# Patient Record
Sex: FEMALE | Race: WHITE | HISPANIC OR LATINO
[De-identification: names, ages, dates, MRNs, and addresses within clinical notes are randomized per-mention and may not be internally consistent; named-entity substitution may affect disease eponyms.]

---

## 2020-06-15 ENCOUNTER — APPOINTMENT (OUTPATIENT)
Dept: ORTHOPEDIC SURGERY | Facility: CLINIC | Age: 50
End: 2020-06-15
Payer: COMMERCIAL

## 2020-06-15 VITALS
HEIGHT: 60 IN | BODY MASS INDEX: 36.52 KG/M2 | WEIGHT: 186 LBS | SYSTOLIC BLOOD PRESSURE: 145 MMHG | HEART RATE: 67 BPM | DIASTOLIC BLOOD PRESSURE: 84 MMHG

## 2020-06-15 DIAGNOSIS — S62.664A NONDISPLACED FRACTURE OF DISTAL PHALANX OF RIGHT RING FINGER, INITIAL ENCOUNTER FOR CLOSED FRACTURE: ICD-10-CM

## 2020-06-15 DIAGNOSIS — M65.331 TRIGGER FINGER, RIGHT MIDDLE FINGER: ICD-10-CM

## 2020-06-15 DIAGNOSIS — Z78.9 OTHER SPECIFIED HEALTH STATUS: ICD-10-CM

## 2020-06-15 DIAGNOSIS — Z86.39 PERSONAL HISTORY OF OTHER ENDOCRINE, NUTRITIONAL AND METABOLIC DISEASE: ICD-10-CM

## 2020-06-15 DIAGNOSIS — M65.311 TRIGGER THUMB, RIGHT THUMB: ICD-10-CM

## 2020-06-15 PROCEDURE — 20550 NJX 1 TENDON SHEATH/LIGAMENT: CPT | Mod: F5

## 2020-06-15 PROCEDURE — 99244 OFF/OP CNSLTJ NEW/EST MOD 40: CPT | Mod: 25

## 2020-06-16 NOTE — PROCEDURE
[de-identified] : Injection: Right hand, first digit.\par Indication: Trigger finger.\par \par A discussion was had with the patient regarding this procedure and all questions were answered. All risks, benefits and alternatives were discussed. These included but were not limited to bleeding, infection, and allergic reaction. A timeout was performed prior to the procedure to ensure proper side and location.  Alcohol was used to clean and sterilize the skin over the volar aspect of the MCP. A 25-gauge needle was used to inject 0.5cc of 0.5% marcaine and 1cc of 6mg/mL betamethasone into the flexor tendon sheath. A sterile bandage was then applied. The patient tolerated the procedure well and there were no complications. \par _______________\par Injection: Right hand, third digit.\par Indication: Trigger finger.\par \par A discussion was had with the patient regarding this procedure and all questions were answered. All risks, benefits and alternatives were discussed. These included but were not limited to bleeding, infection, and allergic reaction. A timeout was performed prior to the procedure to ensure proper side and location.  Alcohol was used to clean and sterilize the skin over the volar aspect of the MCP. A 25-gauge needle was used to inject 0.5cc of 0.5% marcaine and 1cc of 6mg/mL betamethasone into the flexor tendon sheath. A sterile bandage was then applied. The patient tolerated the procedure well and there were no complications.

## 2020-06-16 NOTE — HISTORY OF PRESENT ILLNESS
[de-identified] : Patient is here for right hand pain. She states that it began about 6 months ago. She does not recall any particular injury. She was seen on 6/9/2020 and sent for an xray that was positive for fracture in her 4th digit. She states that her 3rd digit gets stuck. She has N/T throughout her hand. She has continued working at [employer] during this time. She has taken OTC pain medications. \par \par The patient's past medical history, past surgical history, medications and allergies were reviewed by me today and documented accordingly. In addition, the patient's family and social history, which were noncontributory to this visit, were reviewed also. Intake form was reviewed. The patient has no family history of arthritis.

## 2020-06-16 NOTE — PHYSICAL EXAM
[de-identified] : Constitutional: Well-nourished, well-developed, No acute distress\par Respiratory:  Good respiratory effort, no SOB\par Lymphatic: No regional lymphadenopathy, no lymphedema\par Psychiatric: Pleasant and normal affect, alert and oriented x3\par Skin: Clean dry and intact B/L UE/LE\par Musculoskeletal: normal except where as noted in regional exam\par \par \par Left hand:\par APPEARANCE: no marked deformities, no swelling or malalignment\par POSITIVE TENDERNESS: none\par NONTENDER: osseous and ligamentous structures. \par ROM: full & painless in CMC, MCP, and IP joints. \par RESISTIVE TESTING: painless resisted testing. \par SPECIAL TESTS: normal sensation of 1st through 5th digits, normal flex/ext, abd/add, and opposition of thumb, no triggering of fingers\par Vasc: 2+ radial pulse, normal capillary refill\par Neuro: AIN, PIN, Ulnar nerve intact to motor\par Sensation: Intact to light touch throughout\par \par B/L Elbows:  No asymmetry, malalignment, or swelling, Full ROM, 5/5 strength in flex/ext, pronation/supination, Joints stable\par B/L wrists: No asymmetry, malalignment, or swelling, Full ROM, 5/5 strength in wrist flexion/ext, and radial/ulnar deviation, Joints stable\par \par Right hand:\par APPEARANCE: no marked deformities, no swelling or malalignment\par POSITIVE TENDERNESS: mild along flexor tendon and sheath superficial to the MCP at the first and third digits\par NONTENDER: osseous and ligamentous structures. \par ROM: full & painless although + triggering of the involved finger going from full flexion into extension. \par RESISTIVE TESTING: painless resisted testing. \par SPECIAL TESTS: normal sensation of 1st through 5th digits, normal flex/ext, abd/add, and opposition of thumb, no triggering of other fingers\par Vasc: 2+ radial pulse, normal capillary refill\par Neuro: AIN, PIN, Ulnar nerve intact to motor\par Sensation: Intact to light touch throughout\par  [de-identified] : I reviewed and clinically correlated the following outside imaging studies,\par \par RIGHT HAND XRAY 2 VIEWS\par HISTORY: M25.341 Right hand instability G56.01 Carpal tunnel syndrome, right upper limb\par M79.641 Right hand pain M05.841 Right hand rheumatoid arthritis with rheumatoid factor\par Views: AP and lateral.\par There is fracture deformity of the tuft of the distal phalanx of the right fourth finger.\par Superimposed densities are likely part of the patient's manicure. There are no arthritic\par changes. Carpal bones are intact. Epiphyses are normal.\par IMPRESSION: There is fracture deformity of the tuft of the distal phalanx of the right\par fourth finger.

## 2020-06-16 NOTE — CONSULT LETTER
[Consult Letter:] : I had the pleasure of evaluating your patient, [unfilled]. [Dear  ___] : Dear  [unfilled], [Please see my note below.] : Please see my note below. [Sincerely,] : Sincerely, [FreeTextEntry2] : [id number], [place], [address] [place] [address] [FreeTextEntry3] : Maksim Palacio DO, ATC\par Primary Care Sports Medicine\par [facility]

## 2020-06-16 NOTE — DISCUSSION/SUMMARY
[de-identified] : Discussed findings of today's exam and possible causes of patient's pain.  Educated patient on their most probable diagnosis of right hand first and third digit trigger fingers.  Reviewed possible courses of treatment, and we collaboratively decided best course of treatment at this time will include cortisone injections today (see procedure note).  Informed the patient that the numbing medicine in today's injection will last for about 4-6 hours. The steroid that was injected will start to work in 1 to 2 days, peak at 1-2 weeks, and may last up to 4-6 weeks. Discussed with the patient the expected course of trigger fingers, and the variable response to cortisone injections. Some are relieved with a single injection, while others require repeat injections.  Based on the patient's symptoms today, no hand therapy will likely be needed.  We will wait and see how patient responds to this one cortisone injection, may consider repeat injection in 6 weeks if issue is improved but not fully resolved. Patient may consider consultation with hand/wrist surgeon to have a discussion about repeat cortisone injections, versus possible surgical intervention.  \par I also reviewed patient's right hand x-ray today that was performed at [facility] and ordered by her PCP.  It shows that patient has a chronic nondisplaced comminuted distal phalanx fracture of the right hand fourth digit.  I educated the patient and her  that this is likely some distant tuft fracture where the bones never fully healed.  However, patient has no abnormal findings on clinical exam, no pain in this digit, no intervention needed for this old tuft fracture.\par Patient appreciates and agrees with current plan.\par \par This note was generated using dragon medical dictation software.  A reasonable effort has been made for proofreading its contents, but typos may still remain.  If there are any questions or points of clarification needed please notify my office.

## 2020-06-22 ENCOUNTER — APPOINTMENT (OUTPATIENT)
Dept: ORTHOPEDIC SURGERY | Facility: CLINIC | Age: 50
End: 2020-06-22
Payer: COMMERCIAL

## 2020-06-22 PROCEDURE — 73030 X-RAY EXAM OF SHOULDER: CPT | Mod: RT

## 2020-06-22 PROCEDURE — 72040 X-RAY EXAM NECK SPINE 2-3 VW: CPT

## 2020-06-22 PROCEDURE — 99214 OFFICE O/P EST MOD 30 MIN: CPT

## 2020-06-22 RX ORDER — MELOXICAM 7.5 MG/1
7.5 TABLET ORAL
Qty: 30 | Refills: 0 | Status: ACTIVE | COMMUNITY
Start: 2020-06-22 | End: 1900-01-01

## 2020-06-22 NOTE — REASON FOR VISIT
[Initial Visit] : an initial visit for [Spouse] : spouse [FreeTextEntry2] : neck/right shoulder pain

## 2020-06-22 NOTE — HISTORY OF PRESENT ILLNESS
[de-identified] : Patient is here for neck/right shoulder pain that began about 6 months ago. She does not recall any particular injury. She pain that radiates down the length of her arm. She has increased pain when lifting her arm. She has N/T into her hands. She has taken OTC pain medications but nothing else to treat this pain. Denies prior injury.\par \par She also states that she has continued to have pain in her 3rd digit despite injection. There has been no recent injury.

## 2020-06-22 NOTE — DISCUSSION/SUMMARY
[de-identified] : Discussed findings of today's exam and possible causes of patient's pain.  Educated patient on their most probable diagnosis of right shoulder impingement, and right cervical paraspinal muscle spasm, with intermittent right cervical radiculopathy.  Reviewed possible courses of treatment, and we collaboratively decided best course of treatment at this time will include conservative management.  Patient started on a course of oral NSAIDs, prescription given for Mobic (We discussed all possible side effects of this medication).  Patient will be started on a course of physical therapy to restore normal range of motion and strength as tolerated.  Follow up as needed.  Patient appreciates and agrees with current plan.\par A  was utilized to communicate the patient's primary language.\par \par This note was generated using dragon medical dictation software.  A reasonable effort has been made for proofreading its contents, but typos may still remain.  If there are any questions or points of clarification needed please notify my office.

## 2020-06-22 NOTE — PHYSICAL EXAM
[de-identified] : Constitutional: Well-nourished, well-developed, No acute distress\par Respiratory:  Good respiratory effort, no SOB\par Lymphatic: No regional lymphadenopathy, no lymphedema\par Psychiatric: Pleasant and normal affect, alert and oriented x3\par Skin: Clean dry and intact B/L UE/LE\par Musculoskeletal: normal except where as noted in regional exam\par \par Cervical Spine Exam\par APPEARANCE: no marked deformities or malalignment, normal curvature, good posture\par POSITIVE TENDERNESS: + Right upper trapezius, levator scapula, rhomboid major, and rhomboid minor, + spasm noted in the same muscles.\par NONTENDER: no bony midline tenderness.\par ROM: limited in all planes, most notably in flexion and sidebending due to pain\par RESISTIVE TESTING: painful 4/5 resisted ext, right sidebending, rotation and shoulder shrug , 5/5 flexion \par SPECIAL TESTS: neg Spurling's b/l\par Vasc: 2+ radial pulse b/l\par Neuro: C5 - T1 intact to motor, DTRs 2+/4 biceps, triceps, brachioradialis\par Sensation: Intact to light touch throughout b/l UE\par \par Thoracic spine:  normal curvature and normal alignment. good posture. no midline bony tenderness, no marked spasm. no marked tenderness in paraspinal muscles.  ROM full & painless all planes\par \par Left Shoulder:\par APPEARANCE: no marked deformities, no swelling or malalignment\par POSITIVE TENDERNESS: none\par NONTENDER: supraspinatus, infraspinatus, teres minor, LH biceps, anterior and posterior capsule, AC joint\par ROM: full & painless, no scapular winging or dyskinesia present\par RESISTIVE TESTING: painless 5/5 resisted flex/ext, empty can/ER/IR, horizontal abd/add \par SPECIAL TESTS: neg Drop Arm, neg Empty Can, neg Madsen/Neers, neg Mckee's, neg Speeds, neg Apprehension, neg cross arm adduction, neg apley's scratch test\par Vasc: 2+ radial pulse\par Neuro: AIN, PIN, Ulnar nerve intact to motor, DTRs 2+/4 biceps, triceps, brachioradialis\par Sensation: Intact to light touch throughout\par B/L Elbows:  No asymmetry, malalignment, or swelling, Full ROM, 5/5 strength in flexion/ext, pronation/supination, Joints stable\par B/L Wrist and Hand:  No asymmetry, malalignment, or swelling, Full ROM, 5/5 strength in wrist and long finger flexion/ext, radial/ulnar deviation, Joints stable\par \par Right Shoulder:\par APPEARANCE: no marked deformities, no swelling or malalignment\par POSITIVE TENDERNESS: supraspinatus, long head biceps tendon\par NONTENDER:  infraspinatus, teres minor. biceps. anterior and posterior capsule. AC joint. \par ROM: full with mild painful arc past 60 degrees, no scapular winging or dyskinesia present\par RESISTIVE TESTING: MMT 4+/5 ER, Flexion and Empty can, 5/5 IR. painless 5/5 resisted ext, horizontal abd/add \par SPECIAL TESTS: + Madsen and Neers, mildly + cross arm adduction, + Speeds, neg Mckee's, neg Drop Arm, neg Apprehension. neg apley's scratch test\par Vasc: 2+ radial pulse\par Neuro: AIN, PIN, Ulnar nerve intact to motor, DTRs 2+/4 biceps, triceps, brachioradialis\par Sensation: Intact to light touch throughout\par  [de-identified] : \par The following radiographs were ordered and read by me during this patient's visit. I reviewed each radiograph in detail with the patient and discussed the findings as highlighted below. \par \par 3 views of the right shoulder were obtained today that show no fracture, or dislocation. There are no degenerative changes seen. There is no malalignment. No obvious osseous abnormality. Otherwise unremarkable.\par \par 2 views of the cervical spine were obtained today that show no fracture, or dislocation. There are no degenerative changes seen. There is no malalignment. No obvious osseous abnormality. Otherwise unremarkable.

## 2020-09-08 ENCOUNTER — APPOINTMENT (OUTPATIENT)
Dept: ORTHOPEDIC SURGERY | Facility: CLINIC | Age: 50
End: 2020-09-08
Payer: COMMERCIAL

## 2020-09-08 DIAGNOSIS — M75.41 IMPINGEMENT SYNDROME OF RIGHT SHOULDER: ICD-10-CM

## 2020-09-08 PROCEDURE — 20550 NJX 1 TENDON SHEATH/LIGAMENT: CPT | Mod: F7

## 2020-09-08 PROCEDURE — 99214 OFFICE O/P EST MOD 30 MIN: CPT | Mod: 25

## 2020-09-08 NOTE — PHYSICAL EXAM
[de-identified] : Constitutional: Well-nourished, well-developed, No acute distress\par Respiratory: Good respiratory effort, no SOB\par Lymphatic: No regional lymphadenopathy, no lymphedema\par Psychiatric: Pleasant and normal affect, alert and oriented x3\par Skin: Clean dry and intact B/L UE/LE\par Musculoskeletal: normal except where as noted in regional exam\par \par Cervical Spine Exam\par APPEARANCE: no marked deformities or malalignment, normal curvature, good posture\par POSITIVE TENDERNESS: + Right upper trapezius, levator scapula, rhomboid major, and rhomboid minor, + spasm noted in the same muscles.\par NONTENDER: no bony midline tenderness.\par ROM: limited in all planes, most notably in flexion and sidebending due to pain\par RESISTIVE TESTING: painful 4/5 resisted ext, right sidebending, rotation and shoulder shrug , 5/5 flexion \par SPECIAL TESTS: neg Spurling's b/l\par Vasc: 2+ radial pulse b/l\par Neuro: C5 - T1 intact to motor, DTRs 2+/4 biceps, triceps, brachioradialis\par Sensation: Intact to light touch throughout b/l UE\par \par Right Shoulder:\par APPEARANCE: no marked deformities, no swelling or malalignment\par POSITIVE TENDERNESS: supraspinatus, long head biceps tendon\par NONTENDER: infraspinatus, teres minor. biceps. anterior and posterior capsule. AC joint. \par ROM: full with mild painful arc past 60 degrees, no scapular winging or dyskinesia present\par RESISTIVE TESTING: MMT 4+/5 ER, Flexion and Empty can, 5/5 IR. painless 5/5 resisted ext, horizontal abd/add \par SPECIAL TESTS: + Madsen and Neers, mildly + cross arm adduction, + Speeds, neg Mckee's, neg Drop Arm, neg Apprehension. neg apley's scratch test\par Vasc: 2+ radial pulse\par Neuro: AIN, PIN, Ulnar nerve intact to motor, DTRs 2+/4 biceps, triceps, brachioradialis\par Sensation: Intact to light touch throughout\par \par Right hand:\par APPEARANCE: no marked deformities, no swelling or malalignment\par POSITIVE TENDERNESS: mild along flexor tendon and sheath superficial to the MCP at the first and third digits\par NONTENDER: osseous and ligamentous structures. \par ROM: full & painless although + triggering of the involved finger going from full flexion into extension. \par RESISTIVE TESTING: painless resisted testing. \par SPECIAL TESTS: normal sensation of 1st through 5th digits, normal flex/ext, abd/add, and opposition of thumb, no triggering of other fingers\par Vasc: 2+ radial pulse, normal capillary refill\par Neuro: AIN, PIN, Ulnar nerve intact to motor\par Sensation: Intact to light touch throughout\par

## 2020-09-08 NOTE — PROCEDURE
[de-identified] : Injection: Right 3rd digit.\par Indication: Trigger finger.\par \par A discussion was had with the patient regarding this procedure and all questions were answered. All risks, benefits and alternatives were discussed. These included but were not limited to bleeding, infection, and allergic reaction. A timeout was performed prior to the procedure to ensure proper side and location.  Alcohol was used to clean and sterilize the skin over the volar aspect of the MCP. A 25-gauge needle was used to inject 0.5cc of 0.5% marcaine and 1cc of 6mg/mL betamethasone into the flexor tendon sheath. A sterile bandage was then applied. The patient tolerated the procedure well and there were no complications.

## 2020-09-08 NOTE — DISCUSSION/SUMMARY
[de-identified] : Discussed findings of today's exam and possible causes of patient's pain.  Educated patient on their most probable diagnosis of right hand third digit trigger finger.  Reviewed possible courses of treatment, and we collaboratively decided best course of treatment at this time will include cortisone injection today (see procedure note).  Informed the patient that the numbing medicine in today's injection will last for about 4-6 hours. The steroid that was injected will start to work in 1 to 2 days, peak at 1-2 weeks, and may last up to 4-6 weeks. Discussed with the patient the expected course of trigger fingers, and the variable response to cortisone injections. Some are relieved with a single injection, while others require repeat injections.  Patient consented to repeat cortisone injection to the involved trigger fingers today.  We will wait and see how patient responds to this cortisone injection. Advised patient if the problem persists I would recommend consultation with hand/wrist surgeon to have a discussion about surgical intervention as further repeat injections are unlikely to be of much benefit.  \par Patient was also seen today for reevaluation of neck pain and right shoulder pain, primarily still has clinical exam findings consistent with right shoulder impingement and intermittent right upper extremity radiculopathy due to underlying cervical osteoarthritis.  Patient is having some improvement with physical therapy, recommend continuation of her course of PT at this time.  If patient has persisting neck pain and/or radiculopathy would recommend MRI of the C-spine as neck step in management.  Follow up as needed.  Patient appreciates and agrees with current plan.\par \par A  was utilized to communicate the patient's primary language.\par This note was generated using dragon medical dictation software.  A reasonable effort has been made for proofreading its contents, but typos may still remain.  If there are any questions or points of clarification needed please notify my office.

## 2020-09-08 NOTE — HISTORY OF PRESENT ILLNESS
[de-identified] : Patient is here for neck/right hand 3rd digit pain follow up. She has been attending PT and noticed some improvement but is still having pain. She had relief of her trigger finger with the injection but the pain and dysfunction has returned. There has been no recent injury. She has been taking Meloxicam.

## 2020-12-01 ENCOUNTER — APPOINTMENT (OUTPATIENT)
Dept: ORTHOPEDIC SURGERY | Facility: CLINIC | Age: 50
End: 2020-12-01
Payer: COMMERCIAL

## 2020-12-01 PROCEDURE — 20550 NJX 1 TENDON SHEATH/LIGAMENT: CPT | Mod: F7

## 2020-12-01 PROCEDURE — 99072 ADDL SUPL MATRL&STAF TM PHE: CPT

## 2020-12-01 PROCEDURE — 99213 OFFICE O/P EST LOW 20 MIN: CPT | Mod: 25

## 2020-12-01 NOTE — DISCUSSION/SUMMARY
[de-identified] : Patient was seen today for reevaluation and continue management of right hand middle finger digit trigger finger.  Patient advised that repeat injections after the second injection tend to have decreased efficacy, however for pain management at this time patient elected to proceed with repeat cortisone injection today (see procedure note).  Informed the patient that the numbing medicine in today's injection will last for about 4-6 hours. The steroid that was injected will start to work in 1 to 2 days, peak at 1-2 weeks, and may last up to 1-2 months.  I advised the patient and her  they would benefit from scheduling an office visit to have a hand surgery consultation to discuss more permanent resolution of this issue.  Patient is also having chronic intermittent pain of her second through fifth PIP joints of the right hand, there are no significant abnormal findings on exam today.  Recommend use of over-the-counter paraffin wax bath in the morning to help with chronic stiffness.  Patient may take Tylenol as needed for pain.  Patient appreciates and agrees with current plan.\par \par This note was generated using dragon medical dictation software.  A reasonable effort has been made for proofreading its contents, but typos may still remain.  If there are any questions or points of clarification needed please notify my office.

## 2020-12-01 NOTE — HISTORY OF PRESENT ILLNESS
[de-identified] : Patient is here for right hand 3rd digit pain follow up. She had relief from the last injection but her pain returned without further injury.

## 2020-12-01 NOTE — PROCEDURE
[de-identified] : Injection: Right 3rd digit.\par Indication: Trigger finger.\par \par A discussion was had with the patient regarding this procedure and all questions were answered. All risks, benefits and alternatives were discussed. These included but were not limited to bleeding, infection, and allergic reaction. A timeout was performed prior to the procedure to ensure proper side and location.  Alcohol was used to clean and sterilize the skin over the volar aspect of the MCP. A 25-gauge needle was used to inject 0.5cc of 0.5% marcaine and 1cc of 6mg/mL betamethasone into the flexor tendon sheath. A sterile bandage was then applied. The patient tolerated the procedure well and there were no complications.

## 2020-12-01 NOTE — PHYSICAL EXAM
[de-identified] : Constitutional: Well-nourished, well-developed, No acute distress\par Respiratory: Good respiratory effort, no SOB\par Lymphatic: No regional lymphadenopathy, no lymphedema\par Psychiatric: Pleasant and normal affect, alert and oriented x3\par Skin: Clean dry and intact B/L UE/LE\par Musculoskeletal: normal except where as noted in regional exam\par \par Right hand:\par APPEARANCE: no marked deformities, no swelling or malalignment\par POSITIVE TENDERNESS: mild along flexor tendon and sheath superficial to the MCP at the first and third digits\par NONTENDER: osseous and ligamentous structures. \par ROM: full & painless although + triggering of the involved finger going from full flexion into extension. \par RESISTIVE TESTING: painless resisted testing. \par SPECIAL TESTS: normal sensation of 1st through 5th digits, normal flex/ext, abd/add, and opposition of thumb, no triggering of other fingers\par Vasc: 2+ radial pulse, normal capillary refill\par Neuro: AIN, PIN, Ulnar nerve intact to motor\par Sensation: Intact to light touch throughout\par \par Right hand:\par APPEARANCE:  no swelling, no marked deformities or malalignment of the fingers\par POSITIVE TENDERNESS: none\par NONTENDER: all other osseous and ligamentous structures. \par ROM: full & painless in CMC, MCP, and IP joints. \par RESISTIVE TESTING: painless resisted testing. \par

## 2020-12-01 NOTE — REASON FOR VISIT
[Follow-Up Visit] : a follow-up visit for [Spouse] : spouse [FreeTextEntry2] : right hand 3rd digit pain

## 2021-01-07 ENCOUNTER — APPOINTMENT (OUTPATIENT)
Dept: ORTHOPEDIC SURGERY | Facility: CLINIC | Age: 51
End: 2021-01-07
Payer: COMMERCIAL

## 2021-01-07 VITALS — SYSTOLIC BLOOD PRESSURE: 145 MMHG | HEART RATE: 62 BPM | DIASTOLIC BLOOD PRESSURE: 90 MMHG

## 2021-01-07 PROCEDURE — 99214 OFFICE O/P EST MOD 30 MIN: CPT

## 2021-01-07 PROCEDURE — 99072 ADDL SUPL MATRL&STAF TM PHE: CPT

## 2021-02-04 ENCOUNTER — APPOINTMENT (OUTPATIENT)
Dept: ORTHOPEDIC SURGERY | Facility: CLINIC | Age: 51
End: 2021-02-04
Payer: SELF-PAY

## 2021-02-04 PROCEDURE — 20550 NJX 1 TENDON SHEATH/LIGAMENT: CPT | Mod: F7

## 2021-02-04 PROCEDURE — 99072 ADDL SUPL MATRL&STAF TM PHE: CPT

## 2021-02-04 PROCEDURE — 99214 OFFICE O/P EST MOD 30 MIN: CPT | Mod: 25

## 2021-03-10 ENCOUNTER — APPOINTMENT (OUTPATIENT)
Dept: ORTHOPEDIC SURGERY | Facility: CLINIC | Age: 51
End: 2021-03-10

## 2021-03-25 ENCOUNTER — APPOINTMENT (OUTPATIENT)
Dept: ORTHOPEDIC SURGERY | Facility: CLINIC | Age: 51
End: 2021-03-25
Payer: SELF-PAY

## 2021-03-25 DIAGNOSIS — Z78.9 OTHER SPECIFIED HEALTH STATUS: ICD-10-CM

## 2021-03-25 DIAGNOSIS — M65.332 TRIGGER FINGER, LEFT MIDDLE FINGER: ICD-10-CM

## 2021-03-25 PROCEDURE — 99072 ADDL SUPL MATRL&STAF TM PHE: CPT

## 2021-03-25 PROCEDURE — 20612 ASPIRATE/INJ GANGLION CYST: CPT | Mod: F9

## 2021-03-25 PROCEDURE — 99214 OFFICE O/P EST MOD 30 MIN: CPT | Mod: 25

## 2021-03-25 PROCEDURE — 20550 NJX 1 TENDON SHEATH/LIGAMENT: CPT | Mod: F5,59

## 2021-03-28 PROBLEM — Z78.9 NON-SMOKER: Status: ACTIVE | Noted: 2021-03-28

## 2021-05-06 ENCOUNTER — APPOINTMENT (OUTPATIENT)
Dept: ORTHOPEDIC SURGERY | Facility: CLINIC | Age: 51
End: 2021-05-06
Payer: COMMERCIAL

## 2021-05-26 ENCOUNTER — APPOINTMENT (OUTPATIENT)
Dept: ORTHOPEDIC SURGERY | Facility: CLINIC | Age: 51
End: 2021-05-26
Payer: COMMERCIAL

## 2021-05-26 DIAGNOSIS — M67.88 OTHER SPECIFIED DISORDERS OF SYNOVIUM AND TENDON, OTHER SITE: ICD-10-CM

## 2021-05-26 DIAGNOSIS — M77.8 OTHER ENTHESOPATHIES, NOT ELSEWHERE CLASSIFIED: ICD-10-CM

## 2021-05-26 DIAGNOSIS — G89.29 PAIN IN UNSPECIFIED FOOT: ICD-10-CM

## 2021-05-26 DIAGNOSIS — M62.89 OTHER SPECIFIED DISORDERS OF MUSCLE: ICD-10-CM

## 2021-05-26 DIAGNOSIS — M79.673 PAIN IN UNSPECIFIED FOOT: ICD-10-CM

## 2021-05-26 PROCEDURE — 99214 OFFICE O/P EST MOD 30 MIN: CPT

## 2021-05-26 PROCEDURE — 99072 ADDL SUPL MATRL&STAF TM PHE: CPT

## 2021-05-26 PROCEDURE — 73630 X-RAY EXAM OF FOOT: CPT | Mod: 50

## 2021-05-26 RX ORDER — MELOXICAM 15 MG/1
15 TABLET ORAL
Qty: 30 | Refills: 2 | Status: ACTIVE | COMMUNITY
Start: 2021-05-26 | End: 1900-01-01

## 2021-06-10 ENCOUNTER — APPOINTMENT (OUTPATIENT)
Dept: ORTHOPEDIC SURGERY | Facility: CLINIC | Age: 51
End: 2021-06-10
Payer: COMMERCIAL

## 2021-06-10 DIAGNOSIS — M65.311 TRIGGER THUMB, RIGHT THUMB: ICD-10-CM

## 2021-06-10 DIAGNOSIS — G56.01 CARPAL TUNNEL SYNDROME, RIGHT UPPER LIMB: ICD-10-CM

## 2021-06-10 DIAGNOSIS — M67.441 GANGLION, RIGHT HAND: ICD-10-CM

## 2021-06-10 DIAGNOSIS — G56.02 CARPAL TUNNEL SYNDROME, LEFT UPPER LIMB: ICD-10-CM

## 2021-06-10 DIAGNOSIS — M65.341 TRIGGER FINGER, RIGHT RING FINGER: ICD-10-CM

## 2021-06-10 PROCEDURE — 99072 ADDL SUPL MATRL&STAF TM PHE: CPT

## 2021-06-10 PROCEDURE — 99214 OFFICE O/P EST MOD 30 MIN: CPT

## 2021-06-14 PROBLEM — M67.441 GANGLION CYST OF FLEXOR TENDON SHEATH OF FINGER, RIGHT: Status: ACTIVE | Noted: 2021-02-04

## 2021-06-14 PROBLEM — G56.01 CARPAL TUNNEL SYNDROME OF RIGHT WRIST: Status: ACTIVE | Noted: 2021-01-07

## 2021-06-14 PROBLEM — M65.341 TRIGGER RING FINGER OF RIGHT HAND: Status: ACTIVE | Noted: 2021-02-04

## 2021-06-14 PROBLEM — M65.311 TRIGGER FINGER OF RIGHT THUMB: Status: ACTIVE | Noted: 2020-06-15

## 2021-06-14 PROBLEM — G56.02 CARPAL TUNNEL SYNDROME OF LEFT WRIST: Status: ACTIVE | Noted: 2021-01-07

## 2021-07-02 ENCOUNTER — OUTPATIENT (OUTPATIENT)
Dept: OUTPATIENT SERVICES | Facility: HOSPITAL | Age: 51
LOS: 1 days | End: 2021-07-02
Payer: SELF-PAY

## 2021-07-02 VITALS
DIASTOLIC BLOOD PRESSURE: 76 MMHG | OXYGEN SATURATION: 99 % | WEIGHT: 201.06 LBS | RESPIRATION RATE: 20 BRPM | TEMPERATURE: 98 F | SYSTOLIC BLOOD PRESSURE: 110 MMHG | HEIGHT: 60 IN | HEART RATE: 71 BPM

## 2021-07-02 DIAGNOSIS — M67.441 GANGLION, RIGHT HAND: ICD-10-CM

## 2021-07-02 DIAGNOSIS — M65.311 TRIGGER THUMB, RIGHT THUMB: ICD-10-CM

## 2021-07-02 DIAGNOSIS — Z87.39 PERSONAL HISTORY OF OTHER DISEASES OF THE MUSCULOSKELETAL SYSTEM AND CONNECTIVE TISSUE: ICD-10-CM

## 2021-07-02 DIAGNOSIS — M65.331 TRIGGER FINGER, RIGHT MIDDLE FINGER: ICD-10-CM

## 2021-07-02 DIAGNOSIS — Z98.890 OTHER SPECIFIED POSTPROCEDURAL STATES: Chronic | ICD-10-CM

## 2021-07-02 DIAGNOSIS — Z01.818 ENCOUNTER FOR OTHER PREPROCEDURAL EXAMINATION: ICD-10-CM

## 2021-07-02 LAB
ANION GAP SERPL CALC-SCNC: 13 MMOL/L — SIGNIFICANT CHANGE UP (ref 5–17)
BUN SERPL-MCNC: 11 MG/DL — SIGNIFICANT CHANGE UP (ref 7–23)
CALCIUM SERPL-MCNC: 9.5 MG/DL — SIGNIFICANT CHANGE UP (ref 8.4–10.5)
CHLORIDE SERPL-SCNC: 109 MMOL/L — HIGH (ref 96–108)
CO2 SERPL-SCNC: 20 MMOL/L — LOW (ref 22–31)
CREAT SERPL-MCNC: 0.65 MG/DL — SIGNIFICANT CHANGE UP (ref 0.5–1.3)
GLUCOSE SERPL-MCNC: 123 MG/DL — HIGH (ref 70–99)
HCT VFR BLD CALC: 39.6 % — SIGNIFICANT CHANGE UP (ref 34.5–45)
HGB BLD-MCNC: 12.4 G/DL — SIGNIFICANT CHANGE UP (ref 11.5–15.5)
MCHC RBC-ENTMCNC: 25.5 PG — LOW (ref 27–34)
MCHC RBC-ENTMCNC: 31.3 GM/DL — LOW (ref 32–36)
MCV RBC AUTO: 81.3 FL — SIGNIFICANT CHANGE UP (ref 80–100)
NRBC # BLD: 0 /100 WBCS — SIGNIFICANT CHANGE UP (ref 0–0)
PLATELET # BLD AUTO: 319 K/UL — SIGNIFICANT CHANGE UP (ref 150–400)
POTASSIUM SERPL-MCNC: 4 MMOL/L — SIGNIFICANT CHANGE UP (ref 3.5–5.3)
POTASSIUM SERPL-SCNC: 4 MMOL/L — SIGNIFICANT CHANGE UP (ref 3.5–5.3)
RBC # BLD: 4.87 M/UL — SIGNIFICANT CHANGE UP (ref 3.8–5.2)
RBC # FLD: 15.3 % — HIGH (ref 10.3–14.5)
SODIUM SERPL-SCNC: 142 MMOL/L — SIGNIFICANT CHANGE UP (ref 135–145)
WBC # BLD: 4.24 K/UL — SIGNIFICANT CHANGE UP (ref 3.8–10.5)
WBC # FLD AUTO: 4.24 K/UL — SIGNIFICANT CHANGE UP (ref 3.8–10.5)

## 2021-07-02 PROCEDURE — 80048 BASIC METABOLIC PNL TOTAL CA: CPT

## 2021-07-02 PROCEDURE — G0463: CPT

## 2021-07-02 PROCEDURE — 85027 COMPLETE CBC AUTOMATED: CPT

## 2021-07-02 RX ORDER — SODIUM CHLORIDE 9 MG/ML
3 INJECTION INTRAMUSCULAR; INTRAVENOUS; SUBCUTANEOUS EVERY 8 HOURS
Refills: 0 | Status: DISCONTINUED | OUTPATIENT
Start: 2021-07-16 | End: 2021-07-30

## 2021-07-02 RX ORDER — LIDOCAINE HCL 20 MG/ML
0.2 VIAL (ML) INJECTION ONCE
Refills: 0 | Status: DISCONTINUED | OUTPATIENT
Start: 2021-07-16 | End: 2021-07-30

## 2021-07-02 NOTE — H&P PST ADULT - ATTENDING COMMENTS
At Marina 10, 2021, office visit, the patient’s primary problem was painful triggering right long finger. Physical exam showed marked tenderness of A1 pulley, and there was active and painful triggering of right long finger. Because patient has had 4 cortisone injections for right long trigger finger, additional cortisone injections are contraindicated, and therefore, surgical release of right long trigger finger is indicated.      At Marina 10, 2021 office visit other findings included: right thumb A1 pulley is minimally tender without triggering; right ring finger A1 pulley minimally tender without triggering, and right little finger A1 pulley cyst was palpable but nontender and there was no triggering in this digit.     In addition, on Marina 10, 2021, the patient had normal sensation in all fingers at rest bilaterally, and provocative testing for carpal tunnel syndrome (Lashanda test) resulted in only mild symptoms bilaterally. Previously performed electrodiagnostic studies showed normal median sensorimotor conductions across the wrist and normal EMG testing, bilaterally.  Right carpal tunnel release has been discussed with patient and , but on Marina 10, 2021, symptoms were quite mild. Carpal tunnel release is not formally planned, but if symptoms are acute immediately preop on July 16, 2021, then carpal tunnel cortisone injection or carpal tunnel release will be reconsidered.     Surgery for right long trigger finger is indicated because of symptoms refractory to conservative treatment. Surgical plan is A1 pulley release, exam under anesthesia with active finger flexion/extension, and excision of one slip of FDS tendon if there is continued triggering after A1 pulley release.      As noted above, release or cortisone injection(s) of other trigger fingers, excision of retinacular cyst, and possible carpal tunnel cortisone injection or release will all be reconsidered immediately preop associated with reexamination of the patient and conferencing with the patient and .     The patient has pain and interference with activities of daily living. While the patient may see improvement, the patient may not have complete range of motion or complete return to activities of daily living. Postoperative hand therapy, and other treatment modalities may be required. The range of treatment alternatives, and the associated risks complications limitations and expectations were explained and discussed. Infection, incomplete range of motion, stiffness, pain, palmar fibromatosis are a just few of many factors reviewed and discussed with the patient.     All questions have been answered. The patient and her  have expressed acceptance and understanding of the above and have consented to surgery.

## 2021-07-02 NOTE — H&P PST ADULT - NSANTHOSAYNRD_GEN_A_CORE
No. LILA screening performed.  STOP BANG Legend: 0-2 = LOW Risk; 3-4 = INTERMEDIATE Risk; 5-8 = HIGH Risk

## 2021-07-02 NOTE — H&P PST ADULT - NSICDXPASTSURGICALHX_GEN_ALL_CORE_FT
PAST SURGICAL HISTORY:   delivery delivered x2     PAST SURGICAL HISTORY:   delivery delivered x2    History of endoscopy h/o upper endoscopy

## 2021-07-02 NOTE — H&P PST ADULT - NSICDXPROBLEM_GEN_ALL_CORE_FT
PROBLEM DIAGNOSES  Problem: History of trigger finger  Assessment and Plan: scheduled for right long finger trigger release/ poss right thumb trigger release/ poss excision retinacular cyst right little finger/ poss partial tendon excision/ poss cortisone injection righ tthumb right ring and right little finger.  preop instruction discussed and patient verbalized understanding  ucg on admit, urine cup given   instructed on the use of chlorhexidine wash

## 2021-07-02 NOTE — H&P PST ADULT - NSICDXPASTMEDICALHX_GEN_ALL_CORE_FT
PAST MEDICAL HISTORY:  Foot pain, bilateral started on pain meds unkonwn name    History of migraine headaches      PAST MEDICAL HISTORY:  2019 novel coronavirus disease (COVID-19)     Depression no medication    Foot pain, bilateral started on pain meds unkonwn name    History of migraine headaches     History of trigger finger     Mild heartburn

## 2021-07-02 NOTE — H&P PST ADULT - HISTORY OF PRESENT ILLNESS
50 year old Guinean speaking female presents with her  for preop testing for scheduled right long finger trigger release/ possible right thumb trigger release/ possible excision retinacular cyst right little finger/ possible partial tendon excision/ possible cortisone injection right thumb, right ring and right little finger trigger. Patient c/o pain and numbness, limited ROM, no swelling noted. Also her medical history includes hypothyroidism, obesity, heartburn, migraine headache, and previous covid-19 infection (had loss of taste and smell, no hospitalization).    ***Patient is fully vaccinated, copy of vaccination card on file

## 2021-07-15 ENCOUNTER — TRANSCRIPTION ENCOUNTER (OUTPATIENT)
Age: 51
End: 2021-07-15

## 2021-07-16 ENCOUNTER — APPOINTMENT (OUTPATIENT)
Dept: ORTHOPEDIC SURGERY | Facility: HOSPITAL | Age: 51
End: 2021-07-16

## 2021-07-16 ENCOUNTER — OUTPATIENT (OUTPATIENT)
Dept: OUTPATIENT SERVICES | Facility: HOSPITAL | Age: 51
LOS: 1 days | End: 2021-07-16
Payer: COMMERCIAL

## 2021-07-16 VITALS
HEIGHT: 60 IN | OXYGEN SATURATION: 99 % | TEMPERATURE: 98 F | SYSTOLIC BLOOD PRESSURE: 124 MMHG | DIASTOLIC BLOOD PRESSURE: 82 MMHG | WEIGHT: 201.06 LBS | RESPIRATION RATE: 16 BRPM | HEART RATE: 58 BPM

## 2021-07-16 VITALS
DIASTOLIC BLOOD PRESSURE: 79 MMHG | OXYGEN SATURATION: 97 % | TEMPERATURE: 97 F | RESPIRATION RATE: 15 BRPM | HEART RATE: 70 BPM | SYSTOLIC BLOOD PRESSURE: 130 MMHG

## 2021-07-16 DIAGNOSIS — M65.311 TRIGGER THUMB, RIGHT THUMB: ICD-10-CM

## 2021-07-16 DIAGNOSIS — Z01.818 ENCOUNTER FOR OTHER PREPROCEDURAL EXAMINATION: ICD-10-CM

## 2021-07-16 DIAGNOSIS — M67.441 GANGLION, RIGHT HAND: ICD-10-CM

## 2021-07-16 DIAGNOSIS — Z98.890 OTHER SPECIFIED POSTPROCEDURAL STATES: Chronic | ICD-10-CM

## 2021-07-16 DIAGNOSIS — M65.331 TRIGGER FINGER, RIGHT MIDDLE FINGER: ICD-10-CM

## 2021-07-16 PROCEDURE — 20550 NJX 1 TENDON SHEATH/LIGAMENT: CPT | Mod: 59,F9

## 2021-07-16 PROCEDURE — 20550 NJX 1 TENDON SHEATH/LIGAMENT: CPT | Mod: F8

## 2021-07-16 PROCEDURE — 26055 INCISE FINGER TENDON SHEATH: CPT | Mod: F7

## 2021-07-16 RX ORDER — CHLORHEXIDINE GLUCONATE 213 G/1000ML
1 SOLUTION TOPICAL ONCE
Refills: 0 | Status: COMPLETED | OUTPATIENT
Start: 2021-07-16 | End: 2021-07-16

## 2021-07-16 RX ORDER — LANSOPRAZOLE 15 MG/1
1 CAPSULE, DELAYED RELEASE ORAL
Qty: 0 | Refills: 0 | DISCHARGE

## 2021-07-16 RX ORDER — CELECOXIB 200 MG/1
1 CAPSULE ORAL
Qty: 0 | Refills: 0 | DISCHARGE

## 2021-07-16 RX ORDER — LEVOTHYROXINE SODIUM 125 MCG
1 TABLET ORAL
Qty: 0 | Refills: 0 | DISCHARGE

## 2021-07-16 RX ADMIN — CHLORHEXIDINE GLUCONATE 1 APPLICATION(S): 213 SOLUTION TOPICAL at 06:10

## 2021-07-16 NOTE — ASU DISCHARGE PLAN (ADULT/PEDIATRIC) - CARE PROVIDER_API CALL
Jose De Guzman (MD)  Orthopaedic Surgery; Surgery of the Hand  [address], [facility] [address]  [place] [address]  Phone: [phone]  Fax: [phone]  Follow Up Time:

## 2021-07-16 NOTE — ASU PATIENT PROFILE, ADULT - INTERNATIONAL TRAVEL
No Spine appears normal, but she has limited rom of the neck due to pain and pain on palp of the supraspinatus m of r shoulder, no muscle or joint tenderness

## 2021-07-16 NOTE — BRIEF OPERATIVE NOTE - NSICDXBRIEFPREOP_GEN_ALL_CORE_FT
PRE-OP DIAGNOSIS:  Right trigger finger 16-Jul-2021 07:58:36 long finger, ring finger, little finger Kit Orosco

## 2021-07-16 NOTE — BRIEF OPERATIVE NOTE - NSICDXBRIEFPROCEDURE_GEN_ALL_CORE_FT
PROCEDURES:  Release, trigger finger 16-Jul-2021 07:58:12 right hand  long finger Kit Orosco  Injection of trigger finger 16-Jul-2021 07:59:12 ring, middle finger Kit Orosco

## 2021-07-16 NOTE — BRIEF OPERATIVE NOTE - OPERATION/FINDINGS
Corticosteroid injection performed for R ring and little finger trigger fingers. Release of long finger trigger performed

## 2021-07-16 NOTE — ASU PATIENT PROFILE, ADULT - REASON FOR ADMISSION, PROFILE
right ring finger trigger release possible right thumb trigger release possible excision Retinacular cyst

## 2021-07-16 NOTE — BRIEF OPERATIVE NOTE - NSICDXBRIEFPOSTOP_GEN_ALL_CORE_FT
POST-OP DIAGNOSIS:  Right trigger finger 16-Jul-2021 07:58:58 long finger, ring finger, little finger Kit Orosco

## 2021-07-16 NOTE — ASU PATIENT PROFILE, ADULT - PMH
2019 novel coronavirus disease (COVID-19)    Depression  no medication  Foot pain, bilateral  started on pain meds unkonwn name  History of migraine headaches    History of trigger finger    Mild heartburn

## 2021-07-19 ENCOUNTER — NON-APPOINTMENT (OUTPATIENT)
Age: 51
End: 2021-07-19

## 2021-07-29 ENCOUNTER — APPOINTMENT (OUTPATIENT)
Dept: ORTHOPEDIC SURGERY | Facility: CLINIC | Age: 51
End: 2021-07-29
Payer: COMMERCIAL

## 2021-07-29 DIAGNOSIS — M65.331 TRIGGER FINGER, RIGHT MIDDLE FINGER: ICD-10-CM

## 2021-07-29 DIAGNOSIS — M65.351 TRIGGER FINGER, RIGHT LITTLE FINGER: ICD-10-CM

## 2021-07-29 PROCEDURE — 99024 POSTOP FOLLOW-UP VISIT: CPT

## 2022-01-10 PROBLEM — U07.1 COVID-19: Chronic | Status: ACTIVE | Noted: 2021-07-02

## 2022-01-10 PROBLEM — Z86.69 PERSONAL HISTORY OF OTHER DISEASES OF THE NERVOUS SYSTEM AND SENSE ORGANS: Chronic | Status: ACTIVE | Noted: 2021-07-02

## 2022-01-10 PROBLEM — Z87.39 PERSONAL HISTORY OF OTHER DISEASES OF THE MUSCULOSKELETAL SYSTEM AND CONNECTIVE TISSUE: Chronic | Status: ACTIVE | Noted: 2021-07-02

## 2022-01-10 PROBLEM — F32.9 MAJOR DEPRESSIVE DISORDER, SINGLE EPISODE, UNSPECIFIED: Chronic | Status: ACTIVE | Noted: 2021-07-02

## 2022-01-10 PROBLEM — R12 HEARTBURN: Chronic | Status: ACTIVE | Noted: 2021-07-02

## 2022-01-18 ENCOUNTER — APPOINTMENT (OUTPATIENT)
Dept: ORTHOPEDIC SURGERY | Facility: CLINIC | Age: 52
End: 2022-01-18
Payer: COMMERCIAL

## 2022-01-18 PROCEDURE — 99214 OFFICE O/P EST MOD 30 MIN: CPT

## 2022-01-18 PROCEDURE — 99072 ADDL SUPL MATRL&STAF TM PHE: CPT
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
## 2022-01-21 ENCOUNTER — APPOINTMENT (OUTPATIENT)
Dept: ORTHOPEDIC SURGERY | Facility: CLINIC | Age: 52
End: 2022-01-21

## 2022-02-10 ENCOUNTER — RX RENEWAL (OUTPATIENT)
Age: 52
End: 2022-02-10

## 2022-03-14 ENCOUNTER — RX RENEWAL (OUTPATIENT)
Age: 52
End: 2022-03-14

## 2022-03-14 RX ORDER — MELOXICAM 7.5 MG/1
7.5 TABLET ORAL
Qty: 30 | Refills: 0 | Status: ACTIVE | COMMUNITY
Start: 2022-01-18 | End: 1900-01-01

## 2022-04-20 PROBLEM — Z00.00 ENCOUNTER FOR PREVENTIVE HEALTH EXAMINATION: Noted: 2022-04-20

## 2022-04-26 ENCOUNTER — APPOINTMENT (OUTPATIENT)
Dept: PAIN MANAGEMENT | Facility: CLINIC | Age: 52
End: 2022-04-26

## 2022-07-25 ENCOUNTER — APPOINTMENT (OUTPATIENT)
Dept: ORTHOPEDIC SURGERY | Facility: CLINIC | Age: 52
End: 2022-07-25

## 2022-07-25 VITALS — BODY MASS INDEX: 38.09 KG/M2 | HEIGHT: 60 IN | WEIGHT: 194 LBS

## 2022-07-25 DIAGNOSIS — M72.2 PLANTAR FASCIAL FIBROMATOSIS: ICD-10-CM

## 2022-07-25 PROCEDURE — 99213 OFFICE O/P EST LOW 20 MIN: CPT

## 2022-07-25 PROCEDURE — 99072 ADDL SUPL MATRL&STAF TM PHE: CPT
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
## 2022-07-26 NOTE — IMAGING
[de-identified] : right foot:\par plantar tenderness\par \par left foot:\par plantar tenderness\par \par Lumbar spine:\par bilateral lumbar paraspinal spasm \par bilateral lumbar paraspinal tenderness\par equivocal straight leg raise  \par \par right knee:\par 3-115\par NVI\par medial joint line tenderness\par \par left knee:\par 3-115\par NVI\par medial joint line tenderness\par \par right hip:\par limited ER and IR\par 5/5\par \par left hip:\par limited ER and IR\par 5/5\par
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
## 2022-09-26 ENCOUNTER — APPOINTMENT (OUTPATIENT)
Dept: ORTHOPEDIC SURGERY | Facility: CLINIC | Age: 52
End: 2022-09-26

## 2022-09-26 VITALS — WEIGHT: 194 LBS | HEIGHT: 60 IN | BODY MASS INDEX: 38.09 KG/M2

## 2022-09-26 PROCEDURE — 20611 DRAIN/INJ JOINT/BURSA W/US: CPT

## 2022-09-26 PROCEDURE — J3490M: CUSTOM

## 2022-09-26 PROCEDURE — 99072 ADDL SUPL MATRL&STAF TM PHE: CPT

## 2022-09-26 PROCEDURE — 99214 OFFICE O/P EST MOD 30 MIN: CPT | Mod: 25

## 2022-09-26 NOTE — PROCEDURE
[Large Joint Injection] : Large joint injection [Knee] : knee [Pain] : pain [Inflammation] : inflammation [X-ray evidence of Osteoarthritis on this or prior visit] : x-ray evidence of Osteoarthritis on this or prior visit [Alcohol] : alcohol [Betadine] : betadine [Ethyl Chloride sprayed topically] : ethyl chloride sprayed topically [Sterile technique used] : sterile technique used [___ cc    1%] : Lidocaine ~Vcc of 1%  [___ cc    0.25%] : Bupivacaine (Marcaine) ~Vcc of 0.25%  [___ cc    40mg] : Methylprednisolone (Depomedrol) ~Vcc of 40 mg  [] : Patient tolerated procedure well [Call if redness, pain or fever occur] : call if redness, pain or fever occur [Apply ice for 15min out of every hour for the next 12-24 hours as tolerated] : apply ice for 15 minutes out of every hour for the next 12-24 hours as tolerated [Patient was advised to rest the joint(s) for ____ days] : patient was advised to rest the joint(s) for [unfilled] days [Previous OTC use and PT nontherapeutic] : patient has tried OTC's including aspirin, Ibuprofen, Aleve, etc or prescription NSAIDS, and/or exercises at home and/or physical therapy without satisfactory response [Risks, benefits, alternatives discussed / Verbal consent obtained] : the risks benefits, and alternatives have been discussed, and verbal consent was obtained [Prior failure or difficult injection] : prior failure or difficult injection [All ultrasound images have been permanently captured and stored accordingly in our picture archiving and communication system] : All ultrasound images have been permanently captured and stored accordingly in our picture archiving and communication system [Visualization of the needle and placement of injection was performed without complication] : visualization of the needle and placement of injection was performed without complication [Left] : of the left

## 2022-09-26 NOTE — IMAGING
[de-identified] : right foot:\par plantar tenderness\par \par left foot:\par plantar tenderness\par \par Lumbar spine:\par bilateral lumbar paraspinal spasm \par bilateral lumbar paraspinal tenderness\par equivocal straight leg raise  \par \par right knee:\par 3-115\par NVI\par medial joint line tenderness\par \par left knee:\par 3-115\par NVI\par medial joint line tenderness\par \par right hip:\par limited ER and IR\par 5/5\par \par left hip:\par limited ER and IR\par 5/5\par
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
## 2022-09-26 NOTE — DISCUSSION/SUMMARY
[de-identified] : The risks, benefits, contents and alternatives to injection were explained in full to the patient.  Risks outlined include but are not limited to infection, sepsis, bleeding, scarring, skin discoloration, temporary increase in pain, syncopal episode, failure to resolve symptoms, allergic reaction, flare reaction, permanent white skin discoloration, symptom recurrence, and elevation of blood sugar in diabetics.  Patient understood the risks.  All questions were answered.  After discussion of options, patient requested an injection.  Oral informed consent was obtained and sterile prep was done of the injection site.  Sterile technique was used to introduce the mixture.  Patient tolerated the procedure well.  Patient advised to ice the injection site this evening.  Signs and symptoms of infection reviewed and patient advised to call immediately for redness, fevers, and/or chills.
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
## 2022-09-29 ENCOUNTER — FORM ENCOUNTER (OUTPATIENT)
Age: 52
End: 2022-09-29

## 2022-09-30 ENCOUNTER — APPOINTMENT (OUTPATIENT)
Dept: MRI IMAGING | Facility: CLINIC | Age: 52
End: 2022-09-30

## 2022-09-30 PROCEDURE — 99072 ADDL SUPL MATRL&STAF TM PHE: CPT

## 2022-09-30 PROCEDURE — 72148 MRI LUMBAR SPINE W/O DYE: CPT

## 2022-10-11 ENCOUNTER — APPOINTMENT (OUTPATIENT)
Dept: ORTHOPEDIC SURGERY | Facility: CLINIC | Age: 52
End: 2022-10-11

## 2022-10-11 VITALS — BODY MASS INDEX: 38.09 KG/M2 | HEIGHT: 60 IN | WEIGHT: 194 LBS

## 2022-10-11 DIAGNOSIS — M72.2 PLANTAR FASCIAL FIBROMATOSIS: ICD-10-CM

## 2022-10-11 PROCEDURE — 99214 OFFICE O/P EST MOD 30 MIN: CPT

## 2022-10-11 PROCEDURE — 73562 X-RAY EXAM OF KNEE 3: CPT | Mod: 50

## 2022-10-11 PROCEDURE — 99072 ADDL SUPL MATRL&STAF TM PHE: CPT
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
## 2022-10-11 NOTE — DATA REVIEWED
[MRI] : MRI [Lumbar Spine] : lumbar spine [Report was reviewed and noted in the chart] : The report was reviewed and noted in the chart [I independently reviewed and interpreted images and report] : I independently reviewed and interpreted images and report [I reviewed the films/CD and additionally noted] : I reviewed the films/CD and additionally noted [FreeTextEntry1] : L5-S1 anterolisthesis with disc bulging

## 2022-10-11 NOTE — DISCUSSION/SUMMARY
[de-identified] : The risks, benefits, contents and alternatives to injection were explained in full to the patient.  Risks outlined include but are not limited to infection, sepsis, bleeding, scarring, skin discoloration, temporary increase in pain, syncopal episode, failure to resolve symptoms, allergic reaction, flare reaction, permanent white skin discoloration, symptom recurrence, and elevation of blood sugar in diabetics.  Patient understood the risks.  All questions were answered.  After discussion of options, patient requested an injection.  Oral informed consent was obtained and sterile prep was done of the injection site.  Sterile technique was used to introduce the mixture.  Patient tolerated the procedure well.  Patient advised to ice the injection site this evening.  Signs and symptoms of infection reviewed and patient advised to call immediately for redness, fevers, and/or chills. \par \par Entered by Apoorva Villareal acting as scribe.\par

## 2022-10-11 NOTE — IMAGING
[de-identified] : right foot:\par plantar tenderness\par \par left foot:\par plantar tenderness\par \par Lumbar spine:\par bilateral lumbar paraspinal spasm \par bilateral lumbar paraspinal tenderness\par equivocal straight leg raise  \par \par right knee:\par 3-115\par NVI\par medial joint line tenderness\par \par left knee:\par 3-115\par NVI\par medial joint line tenderness\par \par right hip:\par limited ER and IR\par 5/5\par \par left hip:\par limited ER and IR\par 5/5\par

## 2022-10-11 NOTE — PROCEDURE
[FreeTextEntry3] : Large joint injection was performed on the right knee. The indication for this procedure was pain, inflammation, and x-ray evidence of Osteoarthritis on this or prior visit. The site was prepped with betadine, ethyl chloride sprayed topically, and sterile technique used. An injection of Lidocaine 3cc of 1% , Bupivacaine (Marcaine) 5cc of 0.25% , Methylprednisolone (Depomedrol) cc of 80 mg was used. Patient was advised to call if redness, pain, or fever occur, apply ice for 15 minutes out of every hour for the next 12-24 hours as tolerated and patient was advised to rest the joint(s) for days.\par Patient has tried OTC's including aspirin, Ibuprofen, Aleve, etc or prescription NSAIDS, and/or exercises at home and/or physical therapy without satisfactory response and patient had decreased mobility in the joint. Ultrasound guidance was indicated for this patient due to better visualize joint space. All ultrasound images have been permanently captured and stored accordingly in our picture.  \par \par Aspiration of the knee was performed using an 18-gauge needle under sterile conditions\par ------------cc of ---------------fluid was aspirated\par (this was sent for cell count, culture, gram stain, and crystals)

## 2022-10-19 ENCOUNTER — APPOINTMENT (OUTPATIENT)
Dept: ORTHOPEDIC SURGERY | Facility: CLINIC | Age: 52
End: 2022-10-19

## 2022-10-19 VITALS — WEIGHT: 190 LBS | BODY MASS INDEX: 37.3 KG/M2 | HEIGHT: 60 IN

## 2022-10-19 DIAGNOSIS — M72.2 PLANTAR FASCIAL FIBROMATOSIS: ICD-10-CM

## 2022-10-19 PROCEDURE — 73630 X-RAY EXAM OF FOOT: CPT | Mod: RT

## 2022-10-19 PROCEDURE — L3485 HEEL, PAD, REMOVABLE FOR SPUR: CPT | Mod: RT

## 2022-10-19 PROCEDURE — 99072 ADDL SUPL MATRL&STAF TM PHE: CPT

## 2022-10-19 PROCEDURE — 99214 OFFICE O/P EST MOD 30 MIN: CPT

## 2022-10-19 RX ORDER — METHYLPREDNISOLONE 4 MG/1
4 TABLET ORAL
Qty: 1 | Refills: 0 | Status: ACTIVE | COMMUNITY
Start: 2022-10-19 | End: 1900-01-01
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
## 2022-10-19 NOTE — ASSESSMENT
[FreeTextEntry1] : The source of most of her symptoms seems to be coming from the lumbar spine, but there is some focal tenderness to the plantar fascia. \par \par Will recommended MDP\par An airheel is recommended.\par Supportive shoes \par Will start PT.\par \par f/u 1 month

## 2022-10-19 NOTE — DATA REVIEWED
[MRI] : MRI [Lumbar Spine] : lumbar spine [I independently reviewed and interpreted images and report] : I independently reviewed and interpreted images and report [I reviewed the films/CD and agree] : I reviewed the films/CD and agree [FreeTextEntry1] : 9/30/22: 1. Convex left curvature.\par 2. L4-L5: Loss of disc signal. Broad bulge, facet arthrosis, and ligamentum flavum hypertrophy with inferior\par foraminal stenosis left greater than right. Central annular fissure.\par 3. L5-S1: Grade 1 anterior spondylolisthesis. Bilateral spondylolysis. Loss of disc signal and height. Broad \par bulge, facet arthrosis with inferior foraminal stenosis. Bulge and superimposed central herniation with annular \par fissure impressing on the thecal sac.

## 2022-10-19 NOTE — PHYSICAL EXAM
[NL (40)] : plantar flexion 40 degrees [NL 30)] : inversion 30 degrees [NL (20)] : eversion 20 degrees [5___] : [facility][unfilled]/5 [Positive Tinel sign at _____] : Positive Tinel sign at [unfilled] [2+] : posterior tibialis pulse: 2+ [Normal] : saphenous nerve sensation normal [Right] : right foot [Weight -] : weightbearing [There are no fractures, subluxations or dislocations. No significant abnormalities are seen] : There are no fractures, subluxations or dislocations. No significant abnormalities are seen [] : non-antalgic [de-identified] : +hypersensitivity throughout the foot.

## 2022-10-19 NOTE — PHYSICAL EXAM
[NL (40)] : plantar flexion 40 degrees [NL 30)] : inversion 30 degrees [NL (20)] : eversion 20 degrees [5___] : [facility] 5[unfilled]/5 [Positive Tinel sign at _____] : Positive Tinel sign at [unfilled] [2+] : posterior tibialis pulse: 2+ [Normal] : saphenous nerve sensation normal [Right] : right foot [Weight -] : weightbearing [There are no fractures, subluxations or dislocations. No significant abnormalities are seen] : There are no fractures, subluxations or dislocations. No significant abnormalities are seen [] : non-antalgic [de-identified] : +hypersensitivity throughout the foot.

## 2022-10-27 ENCOUNTER — APPOINTMENT (OUTPATIENT)
Dept: ORTHOPEDIC SURGERY | Facility: CLINIC | Age: 52
End: 2022-10-27

## 2022-10-27 VITALS — BODY MASS INDEX: 37.3 KG/M2 | HEIGHT: 60 IN | WEIGHT: 190 LBS

## 2022-10-27 PROCEDURE — 99214 OFFICE O/P EST MOD 30 MIN: CPT

## 2022-10-27 PROCEDURE — 99072 ADDL SUPL MATRL&STAF TM PHE: CPT

## 2022-10-27 NOTE — DATA REVIEWED
[MRI] : MRI [Lumbar Spine] : lumbar spine [Report was reviewed and noted in the chart] : The report was reviewed and noted in the chart [I independently reviewed and interpreted images and report] : I independently reviewed and interpreted images and report [I reviewed the films/CD and additionally noted] : I reviewed the films/CD and additionally noted [FreeTextEntry1] : L5-S1 spondylolisthesis

## 2022-10-27 NOTE — PHYSICAL EXAM
[Able to Communicate] : able to communicate [Well Developed] : well developed [Well Nourished] : well nourished [Peripheral vascular exam is grossly normal] : peripheral vascular exam is grossly normal [No Respiratory Distress] : no respiratory distress [Lungs clear to auscultation bilaterally] : lungs clear to auscultation bilaterally [Flexion] : flexion [Extension] : extension [] : mildly antalgic
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
## 2022-11-11 ENCOUNTER — APPOINTMENT (OUTPATIENT)
Dept: PAIN MANAGEMENT | Facility: CLINIC | Age: 52
End: 2022-11-11

## 2022-11-11 VITALS — BODY MASS INDEX: 37.3 KG/M2 | HEIGHT: 60 IN | WEIGHT: 190 LBS

## 2022-11-11 PROCEDURE — 99204 OFFICE O/P NEW MOD 45 MIN: CPT

## 2022-11-11 PROCEDURE — 99072 ADDL SUPL MATRL&STAF TM PHE: CPT

## 2022-11-11 NOTE — ASSESSMENT
[FreeTextEntry1] : After discussing various treatment options with the patient including but not limited to oral medications, physical therapy, exercise, modalities as well as interventional spinal injections, we have decided with the following plan:\par \par 1) Intervention Injection Therapy:\par I personally reviewed the MRI/CT scan images and agree with the radiologist's report. The radiological findings were discussed with the patient.\par The risks, benefits, contents and alternatives to injection were explained in full to the patient. Risks outlined include but are not limited to infection,sepsis, bleeding, post-dural puncture headache, nerve damage, temporary increase in pain, syncopal episode, failure to resolve symptoms, allergic reaction, symptom recurrence, and elevation of blood sugar in diabetics. Cortisone may cause immunosuppression. Patient understands the risks. All questions were answered. After discussion of options, patient requested an injection. Information regarding the injection was given to the patient. Which medications to stop prior to the injection was explained to the patient as well.\par \par Follow up in 1-2 weeks post injection for re-evaluation. \par Continue Home exercises, stretching, activity modification, physical therapy, and conservative care.\par \par Patient presents with axial lumbar pain that has not responded to  3 months of conservative therapy including physical therapy or NSAID therapy.  The pain is interfering with activities of daily living and functionality.   There is no radicular pain.   The pain is exacerbated by facet loading.  Positive Kemps maneuver which is defined by pain reproduction with extension and rotation of the lumbar spine to the affected side.  The patient has not had a vertebral fusion at the levels of the proposed treatment.  There is no unexplained neurologic deficit.  There is no history of systemic infection, unstable medical condition, bleeding tendency, or local infection.  The injection is being performed to diagnose the facet joint as the source of the individual's pain.\par \par b/l lumbar mbb\par \par \par
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
## 2022-11-11 NOTE — PHYSICAL EXAM
[] : lumbar paraspinal tenderness [Flexion] : flexion [Extension] : extension [4___] : right dorsiflexors 4[unfilled]/5 [TWNoteComboBox7] : forward flexion 60 degrees [de-identified] : extension 20 degrees

## 2022-11-15 ENCOUNTER — APPOINTMENT (OUTPATIENT)
Dept: ORTHOPEDIC SURGERY | Facility: CLINIC | Age: 52
End: 2022-11-15

## 2022-11-23 ENCOUNTER — APPOINTMENT (OUTPATIENT)
Dept: ORTHOPEDIC SURGERY | Facility: CLINIC | Age: 52
End: 2022-11-23

## 2022-12-02 ENCOUNTER — APPOINTMENT (OUTPATIENT)
Dept: PAIN MANAGEMENT | Facility: CLINIC | Age: 52
End: 2022-12-02

## 2022-12-02 PROCEDURE — J3490M: CUSTOM

## 2022-12-02 PROCEDURE — 64493 INJ PARAVERT F JNT L/S 1 LEV: CPT | Mod: RT

## 2022-12-02 PROCEDURE — 99072 ADDL SUPL MATRL&STAF TM PHE: CPT

## 2022-12-02 PROCEDURE — 64494 INJ PARAVERT F JNT L/S 2 LEV: CPT | Mod: 59,RT

## 2022-12-02 NOTE — PROCEDURE
[FreeTextEntry3] : Date of Service: 12/02/2022 \par \par Account: [id number]\par \par Patient: ELISHA MARTINEZ \par \par YOB: 1970\par \par Age: 52 year\par \par \par Surgeon:                                      Thania Terrazas M.D.\par \par Assistant:                                    None.\par \par Pre-Operative Diagnosis:            Spondylosis of Lumbar Region without Myelopathy or Radiculopathy (M47.816)    \par \par Post Operative Diagnosis:        Same  \par \par Procedure:                 Right L4-5, L5-S1 Facet block\par \par                                     Left L4-5, L5-S1 Facet block under fluoroscopic guidance\par \par  Anesthesia:                MAC\par \par \par This procedure was carried out using fluoroscopic guidance.  The risks and benefits of the procedure were discussed extensively with the patient.  The consent of the patient was obtained and the following procedure was performed.\par \par The patient was placed in the prone position.  The patient's back was prepped and draped in a sterile fashion.  The left L4 and L5 lumbar vertebral bodies were identified and the fluoroscope left obliqued to approximately 30 degrees to reveal good "Roman-dog" anatomical view.  The junction of the superior articulate process and tranverse process at the L4 and L5 level was then identified and marked. The skin at these target points was then localized using 1 cc of 1% Lidocaine without epinephrine at each injection site.  A spinal needle was then introduced and advanced to the above target points at the junction of the SAP and transverse processes until oss was contacted.  After negative aspiration for heme and CSF, an injectate of 1cc 0.25% marcaine and 10mg of methylprednisolone acetate was injected at each of the two levels.\par \par The right L4 and L5 lumbar vertebral bodies were identified and the fluoroscope right obliqued to approximately 30 degrees to reveal good "Roman-dog" anatomical view.  The junction of the superior articulate process and tranverse process at the L4 and L5 level was then identified and marked. The skin at these target points was then localized using 1 cc of 1% Lidocaine without epinephrine at each injection site.  A spinal needle was then introduced and advanced to the above target points at the junction of the SAP and transverse processes until oss was contacted.  After negative aspiration for heme and CSF, an injectate of 1cc 0.25% marcaine and 10mg of methylprednisolone acetate was injected at each of the two levels.\par \par Fluoroscope then focused on the bilateral sacral ala on AP view, and marked at these points.  The skin and subcutaneous structures were localized using 1cc of 1.0 % lidocaine without epinephrine.  A spinal needle was then advanced under fluoroscopic guidance until oss was contacted at the ala bilaterally.  After negative aspiration for heme and CSF, an injectate of 1cc 0.25% marcaine and 10mg of methylprednisolone acetate was injected at each site.\par \par The needles were then removed and pressure was applied.  Anesthesia personnel were present throughout the procedure, monitoring vitals which were stable throughout.\par \par Thania Terrazas M.D.

## 2022-12-08 ENCOUNTER — APPOINTMENT (OUTPATIENT)
Dept: ORTHOPEDIC SURGERY | Facility: CLINIC | Age: 52
End: 2022-12-08

## 2023-01-17 ENCOUNTER — APPOINTMENT (OUTPATIENT)
Dept: PAIN MANAGEMENT | Facility: CLINIC | Age: 53
End: 2023-01-17

## 2023-01-19 ENCOUNTER — FORM ENCOUNTER (OUTPATIENT)
Age: 53
End: 2023-01-19

## 2023-05-19 ENCOUNTER — APPOINTMENT (OUTPATIENT)
Dept: NEUROLOGY | Facility: CLINIC | Age: 53
End: 2023-05-19

## 2023-07-25 ENCOUNTER — APPOINTMENT (OUTPATIENT)
Dept: ORTHOPEDIC SURGERY | Facility: CLINIC | Age: 53
End: 2023-07-25

## 2023-08-01 ENCOUNTER — APPOINTMENT (OUTPATIENT)
Dept: ORTHOPEDIC SURGERY | Facility: CLINIC | Age: 53
End: 2023-08-01
Payer: COMMERCIAL

## 2023-08-01 VITALS — WEIGHT: 187 LBS | BODY MASS INDEX: 36.71 KG/M2 | HEIGHT: 60 IN

## 2023-08-01 PROCEDURE — 99214 OFFICE O/P EST MOD 30 MIN: CPT
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
## 2023-08-01 NOTE — IMAGING
[de-identified] : right foot:\par  plantar tenderness\par  \par  left foot:\par  plantar tenderness\par  \par  Lumbar spine:\par  bilateral lumbar paraspinal spasm \par  bilateral lumbar paraspinal tenderness\par  equivocal straight leg raise  \par  \par  right knee:\par  3-115\par  NVI\par  medial joint line tenderness\par  \par  left knee:\par  3-115\par  NVI\par  medial joint line tenderness\par  \par  right hip:\par  limited ER and IR\par  5/5\par  \par  left hip:\par  limited ER and IR\par  5/5\par

## 2023-08-01 NOTE — DISCUSSION/SUMMARY
[de-identified] : The patient was advised of the diagnosis.  The natural history of the pathology was explained in full to the patient in layman's terms. All questions were answered.  The risks and benefits of surgical and non-surgical treatment alternatives were explained in full to the patient.  Progress note completed by Shanita Adame PA-C.

## 2023-08-07 ENCOUNTER — APPOINTMENT (OUTPATIENT)
Dept: MRI IMAGING | Facility: CLINIC | Age: 53
End: 2023-08-07
Payer: COMMERCIAL

## 2023-08-07 PROCEDURE — 73721 MRI JNT OF LWR EXTRE W/O DYE: CPT | Mod: LT

## 2023-08-22 ENCOUNTER — APPOINTMENT (OUTPATIENT)
Dept: ORTHOPEDIC SURGERY | Facility: CLINIC | Age: 53
End: 2023-08-22
Payer: COMMERCIAL

## 2023-08-22 VITALS — HEIGHT: 60 IN | WEIGHT: 187 LBS | BODY MASS INDEX: 36.71 KG/M2

## 2023-08-22 DIAGNOSIS — M16.0 BILATERAL PRIMARY OSTEOARTHRITIS OF HIP: ICD-10-CM

## 2023-08-22 PROCEDURE — 99214 OFFICE O/P EST MOD 30 MIN: CPT

## 2023-08-22 RX ORDER — DICLOFENAC SODIUM 75 MG/1
75 TABLET, DELAYED RELEASE ORAL
Qty: 60 | Refills: 0 | Status: ACTIVE | COMMUNITY
Start: 2023-08-22 | End: 1900-01-01

## 2023-08-22 RX ORDER — DICLOFENAC SODIUM 75 MG/1
75 TABLET, DELAYED RELEASE ORAL
Qty: 60 | Refills: 0 | Status: DISCONTINUED | COMMUNITY
Start: 2023-08-22 | End: 2023-08-22

## 2023-08-22 NOTE — DISCUSSION/SUMMARY
[de-identified] : The patient was advised of the diagnosis.  The natural history of the pathology was explained in full to the patient in layman's terms. All questions were answered.  The risks and benefits of surgical and non-surgical treatment alternatives were explained in full to the patient.  Progress note completed by Shanita Adame PA-C.

## 2023-08-22 NOTE — IMAGING
[de-identified] : right foot: plantar tenderness  left foot: plantar tenderness  Lumbar spine: bilateral lumbar paraspinal spasm  bilateral lumbar paraspinal tenderness equivocal straight leg raise    right knee: 3-115 NVI medial joint line tenderness  left knee: 3-115 NVI medial joint line tenderness  right hip: limited ER and IR 5/5  left hip: limited ER and IR 5/5  MRI Rt knee Med men degeneration no tear with OA   Lt knee Med men degeneration no tear with OA
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
## 2023-09-12 ENCOUNTER — APPOINTMENT (OUTPATIENT)
Dept: ORTHOPEDIC SURGERY | Facility: CLINIC | Age: 53
End: 2023-09-12
Payer: COMMERCIAL

## 2023-09-12 VITALS — WEIGHT: 187 LBS | BODY MASS INDEX: 36.71 KG/M2 | HEIGHT: 60 IN

## 2023-09-12 PROCEDURE — 99213 OFFICE O/P EST LOW 20 MIN: CPT

## 2023-10-02 ENCOUNTER — APPOINTMENT (OUTPATIENT)
Dept: ORTHOPEDIC SURGERY | Facility: CLINIC | Age: 53
End: 2023-10-02

## 2023-10-04 ENCOUNTER — APPOINTMENT (OUTPATIENT)
Dept: ORTHOPEDIC SURGERY | Facility: CLINIC | Age: 53
End: 2023-10-04
Payer: COMMERCIAL

## 2023-10-04 ENCOUNTER — NON-APPOINTMENT (OUTPATIENT)
Age: 53
End: 2023-10-04

## 2023-10-04 PROCEDURE — 99215 OFFICE O/P EST HI 40 MIN: CPT | Mod: 25

## 2023-10-04 PROCEDURE — 20552 NJX 1/MLT TRIGGER POINT 1/2: CPT

## 2023-10-04 RX ORDER — HYDROMORPHONE HYDROCHLORIDE 4 MG/1
4 TABLET ORAL EVERY 8 HOURS
Qty: 30 | Refills: 0 | Status: ACTIVE | COMMUNITY
Start: 2023-10-04 | End: 1900-01-01

## 2023-10-10 ENCOUNTER — APPOINTMENT (OUTPATIENT)
Dept: ORTHOPEDIC SURGERY | Facility: CLINIC | Age: 53
End: 2023-10-10
Payer: COMMERCIAL

## 2023-10-10 PROCEDURE — 99212 OFFICE O/P EST SF 10 MIN: CPT | Mod: 25

## 2023-10-10 PROCEDURE — 20611 DRAIN/INJ JOINT/BURSA W/US: CPT | Mod: 50

## 2023-10-17 ENCOUNTER — APPOINTMENT (OUTPATIENT)
Dept: ORTHOPEDIC SURGERY | Facility: CLINIC | Age: 53
End: 2023-10-17

## 2023-10-19 ENCOUNTER — APPOINTMENT (OUTPATIENT)
Dept: PAIN MANAGEMENT | Facility: CLINIC | Age: 53
End: 2023-10-19

## 2023-10-23 ENCOUNTER — APPOINTMENT (OUTPATIENT)
Dept: SPINE | Facility: CLINIC | Age: 53
End: 2023-10-23

## 2023-10-24 ENCOUNTER — APPOINTMENT (OUTPATIENT)
Dept: ORTHOPEDIC SURGERY | Facility: CLINIC | Age: 53
End: 2023-10-24
Payer: COMMERCIAL

## 2023-10-24 PROCEDURE — 20611 DRAIN/INJ JOINT/BURSA W/US: CPT | Mod: 50

## 2023-10-24 PROCEDURE — 99212 OFFICE O/P EST SF 10 MIN: CPT | Mod: 25

## 2023-11-02 ENCOUNTER — APPOINTMENT (OUTPATIENT)
Dept: ORTHOPEDIC SURGERY | Facility: CLINIC | Age: 53
End: 2023-11-02

## 2023-11-03 ENCOUNTER — APPOINTMENT (OUTPATIENT)
Dept: ORTHOPEDIC SURGERY | Facility: CLINIC | Age: 53
End: 2023-11-03
Payer: COMMERCIAL

## 2023-11-03 VITALS — HEIGHT: 60 IN | BODY MASS INDEX: 37.3 KG/M2 | WEIGHT: 190 LBS

## 2023-11-03 DIAGNOSIS — M17.0 BILATERAL PRIMARY OSTEOARTHRITIS OF KNEE: ICD-10-CM

## 2023-11-03 PROCEDURE — 99212 OFFICE O/P EST SF 10 MIN: CPT | Mod: 25

## 2023-11-03 PROCEDURE — 20611 DRAIN/INJ JOINT/BURSA W/US: CPT | Mod: 50
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
## 2023-12-01 ENCOUNTER — APPOINTMENT (OUTPATIENT)
Dept: ORTHOPEDIC SURGERY | Facility: CLINIC | Age: 53
End: 2023-12-01

## 2024-01-22 ENCOUNTER — APPOINTMENT (OUTPATIENT)
Dept: ORTHOPEDIC SURGERY | Facility: CLINIC | Age: 54
End: 2024-01-22
Payer: COMMERCIAL

## 2024-01-22 DIAGNOSIS — M43.10 SPONDYLOLISTHESIS, SITE UNSPECIFIED: ICD-10-CM

## 2024-01-22 PROCEDURE — 20552 NJX 1/MLT TRIGGER POINT 1/2: CPT

## 2024-01-22 PROCEDURE — 99214 OFFICE O/P EST MOD 30 MIN: CPT | Mod: 25
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
## 2024-01-22 NOTE — IMAGING
[de-identified] : R lumbar paraspinal muscle tenderness R lumbar paraspinal muscle spasm appearance in acute distress, standing up d/t pain - tearful   exam guarded, pain with all ROM   motor exam 5/5 strength bilaterally sensory intact + R SLR L5 and S1 dermatome of pain R gastroc weakness   ambulates without assistive device antalgic gait unable to toe walk

## 2024-01-22 NOTE — PROCEDURE
[FreeTextEntry3] : The risks, benefits and contents of the injection have been discussed.  Risks include but are not limited to allergic reaction, flare reaction, permanent white skin discoloration at the injection site and infection.  The patient understands the risks and agrees to having the injection.  All questions have been answered.  Trigger point injection was administered into the right lumbar paraspinal muscle. The site was prepped with alcohol and betadine. An injection of Lidocaine 4cc of 1% , kenalog 60mg was administered. Patient tolerated procedure well.   Patient was advised to call if redness, pain or fever occur and apply ice for 15 minutes out of every hour for the next 12-24 hours as tolerated.   Patient has tried OTC's including aspirin, Ibuprofen, Aleve, etc or prescription NSAIDS, and/or exercises at home and/or physical therapy without satisfactory response, patient had decreased mobility and the risks benefits, and alternatives have been discussed, and verbal consent was obtained.

## 2024-01-22 NOTE — ASSESSMENT
[FreeTextEntry1] : 53 yo F with progressively worsening RLE radicular pain. MRI from Sept 2022 with L5-S1 slip and stenosis; progressive neurological complaints with sensory changes and gastroc weakness. Will obtain updated MRI LS to eval for progression. Was advised last visit to pursue LESI but wasnt able to d/t personal reasons. It has been discussed in the past that she was a good candidate for an L5-S1 fusion if symptoms/weakness persists despite conservative mgmt. PT makes symptoms worse but encouraged HEP to continue strengthening the RLE. TPI into the R lumbar today tolerated well. Pain mgmt referral for L5-S1 LESI. continue gabapentin.  Patient seen by Kiki Grijalva PA-C under the supervision of Derek Raya MD

## 2024-01-31 ENCOUNTER — APPOINTMENT (OUTPATIENT)
Dept: MRI IMAGING | Facility: CLINIC | Age: 54
End: 2024-01-31
Payer: COMMERCIAL

## 2024-01-31 PROCEDURE — 72148 MRI LUMBAR SPINE W/O DYE: CPT

## 2024-02-12 ENCOUNTER — APPOINTMENT (OUTPATIENT)
Dept: ORTHOPEDIC SURGERY | Facility: CLINIC | Age: 54
End: 2024-02-12
Payer: COMMERCIAL

## 2024-02-12 DIAGNOSIS — M43.17 SPONDYLOLISTHESIS, LUMBOSACRAL REGION: ICD-10-CM

## 2024-02-12 PROCEDURE — 99214 OFFICE O/P EST MOD 30 MIN: CPT
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
## 2024-02-12 NOTE — IMAGING
[de-identified] : R lumbar paraspinal muscle tenderness R lumbar paraspinal muscle spasm appearance in acute distress, standing up d/t pain - tearful   exam guarded, pain with all ROM   motor exam 5/5 strength bilaterally sensory intact + R SLR L5 and S1 dermatome of pain R gastroc weakness   ambulates without assistive device antalgic gait unable to toe walk

## 2024-02-12 NOTE — ASSESSMENT
[FreeTextEntry1] : 51 yo F with progressively worsening RLE radicular pain for over a year. Updated MRI shows a slip at L5-S1 with b/l L5 nerve compression. Experiencing progressive neurological complaints with sensory changes and gastroc weakness. No formal tx so far. Scheduled for MAN tomorrow. States TPI at last visit provided good relief.   - Start PT to strengthen core and RLE.  - C/w LESI tomorrow.  - Discussed she's a good candidate for an L5-S1 fusion if symptoms/weakness persists despite conservative mgmt. - F/up in 6 weeks.

## 2024-02-12 NOTE — PHYSICAL EXAM
[Able to Communicate] : able to communicate [Well Developed] : well developed [Well Nourished] : well nourished [Peripheral vascular exam is grossly normal] : peripheral vascular exam is grossly normal [No Respiratory Distress] : no respiratory distress [Lungs clear to auscultation bilaterally] : lungs clear to auscultation bilaterally

## 2024-02-12 NOTE — DATA REVIEWED
[MRI] : MRI [Lumbar Spine] : lumbar spine [Report was reviewed and noted in the chart] : The report was reviewed and noted in the chart [I independently reviewed and interpreted images and report] : I independently reviewed and interpreted images and report [I reviewed the films/CD and additionally noted] : I reviewed the films/CD and additionally noted [FreeTextEntry1] : [facility] L Spine MRI 01/31/24 1. L5-S1 Gr I spondy secondary to chronic b/l spondylolysis of L5 compressing b/l L5.

## 2024-02-13 ENCOUNTER — APPOINTMENT (OUTPATIENT)
Dept: PAIN MANAGEMENT | Facility: CLINIC | Age: 54
End: 2024-02-13

## 2024-03-01 ENCOUNTER — APPOINTMENT (OUTPATIENT)
Dept: ORTHOPEDIC SURGERY | Facility: CLINIC | Age: 54
End: 2024-03-01
Payer: COMMERCIAL

## 2024-03-01 VITALS — WEIGHT: 190 LBS | HEIGHT: 60 IN | BODY MASS INDEX: 37.3 KG/M2

## 2024-03-01 DIAGNOSIS — R22.32 LOCALIZED SWELLING, MASS AND LUMP, LEFT UPPER LIMB: ICD-10-CM

## 2024-03-01 PROCEDURE — 99204 OFFICE O/P NEW MOD 45 MIN: CPT | Mod: 25

## 2024-03-05 ENCOUNTER — APPOINTMENT (OUTPATIENT)
Dept: PAIN MANAGEMENT | Facility: CLINIC | Age: 54
End: 2024-03-05
Payer: COMMERCIAL

## 2024-03-05 VITALS — BODY MASS INDEX: 37.3 KG/M2 | HEIGHT: 60 IN | WEIGHT: 190 LBS

## 2024-03-05 PROCEDURE — 99204 OFFICE O/P NEW MOD 45 MIN: CPT

## 2024-03-05 NOTE — DISCUSSION/SUMMARY
[de-identified] : After discussing various treatment options with the patient including but not limited to oral medications, physical therapy, exercise modalities as well as interventional spinal injections, we have decided with the following plan:  - Continue Home exercises, stretching, activity modification, physical therapy, and conservative care. - MRI report and/or images was reviewed and discussed with the patient. - Recommend B/L L5-S1 Transforaminal Epidural Steroid Injection under fluoroscopic guidance with image. - The risks, benefits and alternatives of the proposed procedure were explained in detail with the patient. The risks outlined include but are not limited to infection, bleeding, post-dural puncture headache, nerve injury, a temporary increase in pain, failure to resolve symptoms, allergic reaction, symptom recurrence, and possible elevation of blood sugar in diabetics. All questions were answered to patient's apparent satisfaction and he/she verbalized an understanding. - Patient is presenting with acute/sub-acute radicular pain with impairment in ADLs and functionality.  The pain has not responded to conservative care including NSAID therapy and/or physical therapy.  There is no bleeding tendency, unstable medical condition, or systemic infection. - Follow up in 1-2 weeks post injection for re-evaluation. - Recommend Gabapentin 300mg QHS. Pt instructed not to drive or operate heavy machinery while on medications. - - -

## 2024-03-05 NOTE — PHYSICAL EXAM
[de-identified] : Constitutional; Appears well, no apparent distress Ability to communicate: Normal  Respiratory: non-labored breathing Skin: No rash noted Head: Normocephalic, atraumatic Neck: no visible thyroid enlargement Eyes: Extraocular movements intact Neurologic: Alert and oriented x3 Psychiatric: normal mood, affect and behavior [] : non-antalgic

## 2024-03-05 NOTE — HISTORY OF PRESENT ILLNESS
[FreeTextEntry1] : Initial HPI 03/05/2024: Pain started 2 years ago few months ago and is on the RIGHT side of the lower back and radiates into the right buttock and down the right thigh and lower leg to the toes described as a sharp shooting pain with associated numbness and tingling. Pain also radiates down the left leg but not as severe as the right. Saw Dr. Raya who recommended LESI and possible L5-S1 fusion.   MRI Lumbar Spine 1/31/24 independently reviewed: L5-S1 spondy with B/L L5 compression  Conservative Care: PT made the pain worse  Pain Medications: Advil PRN; Gabapentin 300mg QHS  Past Injections: TPI with temporary relief  Spine surgery: none  Blood thinners: none [Lower back] : lower back [10] : 10 [8] : 8 [Burning] : burning [Sharp] : sharp [Radiating] : radiating [Shooting] : shooting [Stabbing] : stabbing [Constant] : constant [Sleep] : sleep [FreeTextEntry6] : electric  [] : no [de-identified] : MRI L SPINE 2024 ( PACS )  [FreeTextEntry7] : into the legs , and she feels it in the right toe

## 2024-03-25 ENCOUNTER — APPOINTMENT (OUTPATIENT)
Dept: ORTHOPEDIC SURGERY | Facility: CLINIC | Age: 54
End: 2024-03-25

## 2024-04-02 ENCOUNTER — APPOINTMENT (OUTPATIENT)
Dept: ORTHOPEDIC SURGERY | Facility: CLINIC | Age: 54
End: 2024-04-02

## 2024-04-09 ENCOUNTER — APPOINTMENT (OUTPATIENT)
Dept: ORTHOPEDIC SURGERY | Facility: CLINIC | Age: 54
End: 2024-04-09

## 2024-04-11 ENCOUNTER — APPOINTMENT (OUTPATIENT)
Dept: PAIN MANAGEMENT | Facility: CLINIC | Age: 54
End: 2024-04-11
Payer: COMMERCIAL

## 2024-04-11 PROCEDURE — J3490M: CUSTOM

## 2024-04-11 PROCEDURE — 64483 NJX AA&/STRD TFRM EPI L/S 1: CPT | Mod: 50

## 2024-04-11 NOTE — PROCEDURE
[FreeTextEntry3] : Date of Service: 04/11/2024   Account: [id number]  Patient: BLANCA REYES   YOB: 1970  Age: 53 year   Surgeon:                                                        Andry Hudson D.O.   Assistant:                                                        None.  Pre-Operative Diagnosis:                            Lumbosacral radiculitis (M54.17)  Post-Operative Diagnosis:                          Same  Procedure:                                                    Right L5-S1 transforaminal epidural steroid injection                                                                          Left L5-S1 transforaminal epidural steroid injection under fluoroscopic guidance.  Anesthesia:                                                    MAC with Local   This procedure was carried out using fluoroscopic guidance.  The risks and benefits of the procedure were discussed extensively with the patient.  The consent of the patient was obtained and the following procedure was performed. The patient was placed in the prone position on the fluoroscopic table and the lumbar area was prepped and draped in a sterile fashion. A timeout was performed with all essential staff present and the site and side were verified.  The left L5-S1 neural foramen was then identified on left oblique "richelle dog" anatomical view at the 6 o'clock position using fluoroscopic guidance, and the area was marked. The overlying skin and subcutaneous structures were anesthetized using sterile technique with 1% Lidocaine.  A 22-gauge spinal needle was directed toward the inferior (6 o'clock) position of the pedicle, which formed the roof of the identified foramen.  Once in the epidural space, after negative aspiration for heme and CSF, 1cc of Omnipaque contrast was injected under live fluoroscopy to confirm epidural location and assess filling defects and rule out intravascular needle placement.   The following contrast flow and epidurogram was observed: no intravascular or intrathecal flow pattern was noted.  No blood or CSF was aspirated. Contrast spread appeared to outline the left L5 nerve root and spread medially into the epidural space.    After this, an injectate of 1 cc preservative free normal saline plus 6 mg of betamethasone and 1 cc of 0.25% bupivacaine was injected in the epidural space.  The right L5-S1 neural foramen was then identified on right oblique "richelle dog" anatomical view at the 6 o'clock position using fluoroscopic guidance, and the area was marked. The overlying skin and subcutaneous structures were anesthetized using sterile technique with 1% Lidocaine.  A 22-gauge spinal needle was directed toward the inferior (6 o'clock) position of the pedicle, which formed the roof of the identified foramen.  Once in the epidural space, after negative aspiration for heme and CSF, 1cc of Omnipaque contrast was injected under live fluoroscopy to confirm epidural location and assess filling defects and rule out intravascular needle placement.   The following contrast flow and epidurogram was observed: no intravascular or intrathecal flow pattern was noted.  No blood or CSF was aspirated. Contrast spread appeared to outline the right L5 nerve root and spread medially into the epidural space.    After this, an injectate of 1 cc preservative free normal saline plus 6 mg of betamethasone and 1 cc of 0.25% bupivacaine was injected in the epidural space.  The needle was subsequently removed.  Vital signs remained normal.  Pulse oximeter was used throughout the procedure and the patient's pulse and oxygen saturation remained within normal limits.  The patient tolerated the procedure well.  There were no complications.  The patient was instructed to apply ice over the injection sites for twenty minutes every two hours for the next 24 to 48 hours.  The patient was also instructed to contact me immediately if there were any problems.  Andry Hudson D.O.

## 2024-05-07 ENCOUNTER — APPOINTMENT (OUTPATIENT)
Dept: PAIN MANAGEMENT | Facility: CLINIC | Age: 54
End: 2024-05-07

## 2024-05-28 ENCOUNTER — APPOINTMENT (OUTPATIENT)
Dept: PAIN MANAGEMENT | Facility: CLINIC | Age: 54
End: 2024-05-28
Payer: COMMERCIAL

## 2024-05-28 VITALS — WEIGHT: 190 LBS | HEIGHT: 60 IN | BODY MASS INDEX: 37.3 KG/M2

## 2024-05-28 DIAGNOSIS — M54.50 LOW BACK PAIN, UNSPECIFIED: ICD-10-CM

## 2024-05-28 PROCEDURE — 99214 OFFICE O/P EST MOD 30 MIN: CPT

## 2024-05-28 RX ORDER — GABAPENTIN 300 MG/1
300 CAPSULE ORAL
Qty: 30 | Refills: 0 | Status: ACTIVE | COMMUNITY
Start: 2024-03-05 | End: 1900-01-01

## 2024-05-28 NOTE — HISTORY OF PRESENT ILLNESS
[Lower back] : lower back [10] : 10 [8] : 8 [Burning] : burning [Radiating] : radiating [Sharp] : sharp [Shooting] : shooting [Stabbing] : stabbing [Sleep] : sleep [Neck] : neck [Intermittent] : intermittent [FreeTextEntry1] : 05/28/2024: s/p B/L L5-S1 TFESI on 04/11/24 with >60% relief and improvement of ADLs. Pain still radiating down the b/l legs but much less severe.  Also with b/l neck pain radiating down the b/l arms to the fingers described sharp shooting pain with associated numbness and tingling.   Initial HPI 03/05/2024: Pain started 2 years ago few months ago and is on the RIGHT side of the lower back and radiates into the right buttock and down the right thigh and lower leg to the toes described as a sharp shooting pain with associated numbness and tingling. Pain also radiates down the left leg but not as severe as the right. Saw Dr. Raya who recommended LESI and possible L5-S1 fusion.   MRI Lumbar Spine 1/31/24 independently reviewed: L5-S1 spondy with B/L L5 compression  Conservative Care: PT made the pain worse  Pain Medications: Advil PRN; Gabapentin 300mg QHS  Past Injections: TPI with temporary relief  Spine surgery: none  Blood thinners: none [] : no [FreeTextEntry6] : electric  [FreeTextEntry7] : into the legs , and she feels it in the right toe  [de-identified] : MRI L SPINE 2024 ( PACS )  [TWNoteComboBox1] : 50%

## 2024-05-28 NOTE — DISCUSSION/SUMMARY
[de-identified] : After discussing various treatment options with the patient including but not limited to oral medications, physical therapy, exercise modalities as well as interventional spinal injections, we have decided with the following plan:  - Continue Home exercises, stretching, activity modification, physical therapy, and conservative care. - MRI report and/or images was reviewed and discussed with the patient. - Recommend L5-S1 Lumbar Epidural Steroid Injection under fluoroscopic guidance with image. - The risks, benefits and alternatives of the proposed procedure were explained in detail with the patient. The risks outlined include but are not limited to infection, bleeding, post-dural puncture headache, nerve injury, a temporary increase in pain, failure to resolve symptoms, allergic reaction, symptom recurrence, and possible elevation of blood sugar in diabetics. All questions were answered to patient's apparent satisfaction and he/she verbalized an understanding. - Patient is presenting with acute/sub-acute radicular pain with impairment in ADLs and functionality.  The pain has not responded to conservative care including NSAID therapy and/or physical therapy.  There is no bleeding tendency, unstable medical condition, or systemic infection. - Follow up in 1-2 weeks post injection for re-evaluation.  - Will order cervical MRI to rule out HNP. Please let this note serve as a formal request for authorization to perform a MRI of their Cervical Spine. - Recommend Gabapentin 300mg QHS. Pt instructed not to drive or operate heavy machinery while on medications.

## 2024-05-28 NOTE — PHYSICAL EXAM
[de-identified] : Constitutional; Appears well, no apparent distress Ability to communicate: Normal  Respiratory: non-labored breathing Skin: No rash noted Head: Normocephalic, atraumatic Neck: no visible thyroid enlargement Eyes: Extraocular movements intact Neurologic: Alert and oriented x3 Psychiatric: normal mood, affect and behavior [] : non-antalgic

## 2024-06-05 ENCOUNTER — APPOINTMENT (OUTPATIENT)
Dept: MRI IMAGING | Facility: CLINIC | Age: 54
End: 2024-06-05
Payer: COMMERCIAL

## 2024-06-05 PROCEDURE — 72141 MRI NECK SPINE W/O DYE: CPT

## 2024-06-14 ENCOUNTER — APPOINTMENT (OUTPATIENT)
Dept: PAIN MANAGEMENT | Facility: CLINIC | Age: 54
End: 2024-06-14
Payer: COMMERCIAL

## 2024-06-14 VITALS — WEIGHT: 190 LBS | BODY MASS INDEX: 37.3 KG/M2 | HEIGHT: 60 IN

## 2024-06-14 DIAGNOSIS — M54.17 RADICULOPATHY, LUMBOSACRAL REGION: ICD-10-CM

## 2024-06-14 DIAGNOSIS — M54.50 LOW BACK PAIN, UNSPECIFIED: ICD-10-CM

## 2024-06-14 DIAGNOSIS — M54.16 RADICULOPATHY, LUMBAR REGION: ICD-10-CM

## 2024-06-14 PROCEDURE — 99214 OFFICE O/P EST MOD 30 MIN: CPT

## 2024-06-14 NOTE — DISCUSSION/SUMMARY
[de-identified] : After discussing various treatment options with the patient including but not limited to oral medications, physical therapy, exercise modalities as well as interventional spinal injections, we have decided with the following plan:  - Continue Home exercises, stretching, activity modification, physical therapy, and conservative care. - MRI report and/or images was reviewed and discussed with the patient. - Recommend C7-T1 Cervical Epidural Steroid Injection under fluoroscopic guidance with image. - The risks, benefits and alternatives of the proposed procedure were explained in detail with the patient. The risks outlined include but are not limited to infection, bleeding, post-dural puncture headache, nerve injury, a temporary increase in pain, failure to resolve symptoms, allergic reaction, symptom recurrence, and possible elevation of blood sugar in diabetics. All questions were answered to patient's apparent satisfaction and he/she verbalized an understanding. - Patient is presenting with acute/sub-acute radicular pain with impairment in ADLs and functionality.  The pain has not responded to conservative care including NSAID therapy and/or physical therapy.  There is no bleeding tendency, unstable medical condition, or systemic infection. - Follow up in 1-2 weeks post injection for re-evaluation. - Will order cervical MRI to rule out HNP. Please let this note serve as a formal request for authorization to perform a MRI of their Cervical Spine. - Recommend Gabapentin 300mg QHS. Pt instructed not to drive or operate heavy machinery while on medications.

## 2024-06-14 NOTE — PHYSICAL EXAM
[de-identified] : Constitutional; Appears well, no apparent distress Ability to communicate: Normal  Respiratory: non-labored breathing Skin: No rash noted Head: Normocephalic, atraumatic Neck: no visible thyroid enlargement Eyes: Extraocular movements intact Neurologic: Alert and oriented x3 Psychiatric: normal mood, affect and behavior [] : non-antalgic

## 2024-06-14 NOTE — HISTORY OF PRESENT ILLNESS
[Neck] : neck [Lower back] : lower back [10] : 10 [8] : 8 [Burning] : burning [Radiating] : radiating [Sharp] : sharp [Shooting] : shooting [Stabbing] : stabbing [Intermittent] : intermittent [Sleep] : sleep [FreeTextEntry1] : 06/14/2024: Pt here to go over C-spine MRI. Schedule for LESI on 6/20. Taking gabapentin 300mg QHS which is helping a little.   MRI C-spine 6/5/24 independently reviewed: multilevel disc bulges   05/28/2024: s/p B/L L5-S1 TFESI on 04/11/24 with >60% relief and improvement of ADLs. Pain still radiating down the b/l legs but much less severe.  Also with b/l neck pain radiating down the b/l arms to the fingers described sharp shooting pain with associated numbness and tingling.   Initial HPI 03/05/2024: Pain started 2 years ago few months ago and is on the RIGHT side of the lower back and radiates into the right buttock and down the right thigh and lower leg to the toes described as a sharp shooting pain with associated numbness and tingling. Pain also radiates down the left leg but not as severe as the right. Saw Dr. Raya who recommended LESI and possible L5-S1 fusion.   MRI Lumbar Spine 1/31/24 independently reviewed: L5-S1 spondy with B/L L5 compression  Conservative Care: PT made the pain worse  Pain Medications: Advil PRN; Gabapentin 300mg QHS  Past Injections: TPI with temporary relief  Spine surgery: none  Blood thinners: none [] : no [FreeTextEntry6] : electric  [de-identified] : MRI L SPINE 2024 ( PACS )  [FreeTextEntry7] : into the legs , and she feels it in the right toe

## 2024-06-17 NOTE — HISTORY OF PRESENT ILLNESS
[Neck] : neck [Lower back] : lower back [10] : 10 [8] : 8 [Burning] : burning [Radiating] : radiating [Sharp] : sharp [Shooting] : shooting [Stabbing] : stabbing [Intermittent] : intermittent [Sleep] : sleep [FreeTextEntry1] : 06/20/2024:  06/20/2024: Pt here to go over L-spine MRI  05/28/2024: s/p B/L L5-S1 TFESI on 04/11/24 with >60% relief and improvement of ADLs. Pain still radiating down the b/l legs but much less severe.  Also with b/l neck pain radiating down the b/l arms to the fingers described sharp shooting pain with associated numbness and tingling.   Initial HPI 03/05/2024: Pain started 2 years ago few months ago and is on the RIGHT side of the lower back and radiates into the right buttock and down the right thigh and lower leg to the toes described as a sharp shooting pain with associated numbness and tingling. Pain also radiates down the left leg but not as severe as the right. Saw Dr. Raya who recommended LESI and possible L5-S1 fusion.   MRI Lumbar Spine 1/31/24 independently reviewed: L5-S1 spondy with B/L L5 compression  Conservative Care: PT made the pain worse  Pain Medications: Advil PRN; Gabapentin 300mg QHS  Past Injections: TPI with temporary relief  Spine surgery: none  Blood thinners: none [] : no [FreeTextEntry6] : electric  [FreeTextEntry7] : into the legs , and she feels it in the right toe  [de-identified] : MRI L SPINE 2024 ( PACS )  [TWNoteComboBox1] : 50%

## 2024-06-17 NOTE — DISCUSSION/SUMMARY
[de-identified] : After discussing various treatment options with the patient including but not limited to oral medications, physical therapy, exercise modalities as well as interventional spinal injections, we have decided with the following plan:  - Continue Home exercises, stretching, activity modification, physical therapy, and conservative care. - MRI report and/or images was reviewed and discussed with the patient. - Recommend L5-S1 Lumbar Epidural Steroid Injection under fluoroscopic guidance with image. - The risks, benefits and alternatives of the proposed procedure were explained in detail with the patient. The risks outlined include but are not limited to infection, bleeding, post-dural puncture headache, nerve injury, a temporary increase in pain, failure to resolve symptoms, allergic reaction, symptom recurrence, and possible elevation of blood sugar in diabetics. All questions were answered to patient's apparent satisfaction and he/she verbalized an understanding. - Patient is presenting with acute/sub-acute radicular pain with impairment in ADLs and functionality.  The pain has not responded to conservative care including NSAID therapy and/or physical therapy.  There is no bleeding tendency, unstable medical condition, or systemic infection. - Follow up in 1-2 weeks post injection for re-evaluation.  - Will order cervical MRI to rule out HNP. Please let this note serve as a formal request for authorization to perform a MRI of their Cervical Spine. - Recommend Gabapentin 300mg QHS. Pt instructed not to drive or operate heavy machinery while on medications.

## 2024-06-17 NOTE — HISTORY OF PRESENT ILLNESS
[Neck] : neck [Lower back] : lower back [10] : 10 [8] : 8 [Burning] : burning [Radiating] : radiating [Sharp] : sharp [Shooting] : shooting [Stabbing] : stabbing [Intermittent] : intermittent [Sleep] : sleep [FreeTextEntry1] : 06/20/2024:  06/20/2024: Pt here to go over L-spine MRI  05/28/2024: s/p B/L L5-S1 TFESI on 04/11/24 with >60% relief and improvement of ADLs. Pain still radiating down the b/l legs but much less severe.  Also with b/l neck pain radiating down the b/l arms to the fingers described sharp shooting pain with associated numbness and tingling.   Initial HPI 03/05/2024: Pain started 2 years ago few months ago and is on the RIGHT side of the lower back and radiates into the right buttock and down the right thigh and lower leg to the toes described as a sharp shooting pain with associated numbness and tingling. Pain also radiates down the left leg but not as severe as the right. Saw Dr. Raya who recommended LESI and possible L5-S1 fusion.   MRI Lumbar Spine 1/31/24 independently reviewed: L5-S1 spondy with B/L L5 compression  Conservative Care: PT made the pain worse  Pain Medications: Advil PRN; Gabapentin 300mg QHS  Past Injections: TPI with temporary relief  Spine surgery: none  Blood thinners: none [] : no [FreeTextEntry6] : electric  [FreeTextEntry7] : into the legs , and she feels it in the right toe  [de-identified] : MRI L SPINE 2024 ( PACS )  [TWNoteComboBox1] : 50%

## 2024-06-17 NOTE — DISCUSSION/SUMMARY
[de-identified] : After discussing various treatment options with the patient including but not limited to oral medications, physical therapy, exercise modalities as well as interventional spinal injections, we have decided with the following plan:  - Continue Home exercises, stretching, activity modification, physical therapy, and conservative care. - MRI report and/or images was reviewed and discussed with the patient. - Recommend L5-S1 Lumbar Epidural Steroid Injection under fluoroscopic guidance with image. - The risks, benefits and alternatives of the proposed procedure were explained in detail with the patient. The risks outlined include but are not limited to infection, bleeding, post-dural puncture headache, nerve injury, a temporary increase in pain, failure to resolve symptoms, allergic reaction, symptom recurrence, and possible elevation of blood sugar in diabetics. All questions were answered to patient's apparent satisfaction and he/she verbalized an understanding. - Patient is presenting with acute/sub-acute radicular pain with impairment in ADLs and functionality.  The pain has not responded to conservative care including NSAID therapy and/or physical therapy.  There is no bleeding tendency, unstable medical condition, or systemic infection. - Follow up in 1-2 weeks post injection for re-evaluation.  - Will order cervical MRI to rule out HNP. Please let this note serve as a formal request for authorization to perform a MRI of their Cervical Spine. - Recommend Gabapentin 300mg QHS. Pt instructed not to drive or operate heavy machinery while on medications.

## 2024-06-17 NOTE — PHYSICAL EXAM
[de-identified] : Constitutional; Appears well, no apparent distress Ability to communicate: Normal  Respiratory: non-labored breathing Skin: No rash noted Head: Normocephalic, atraumatic Neck: no visible thyroid enlargement Eyes: Extraocular movements intact Neurologic: Alert and oriented x3 Psychiatric: normal mood, affect and behavior [] : non-antalgic

## 2024-06-17 NOTE — PHYSICAL EXAM
[de-identified] : Constitutional; Appears well, no apparent distress Ability to communicate: Normal  Respiratory: non-labored breathing Skin: No rash noted Head: Normocephalic, atraumatic Neck: no visible thyroid enlargement Eyes: Extraocular movements intact Neurologic: Alert and oriented x3 Psychiatric: normal mood, affect and behavior [] : non-antalgic

## 2024-06-20 ENCOUNTER — APPOINTMENT (OUTPATIENT)
Dept: PAIN MANAGEMENT | Facility: CLINIC | Age: 54
End: 2024-06-20

## 2024-07-02 ENCOUNTER — APPOINTMENT (OUTPATIENT)
Dept: PAIN MANAGEMENT | Facility: CLINIC | Age: 54
End: 2024-07-02
Payer: COMMERCIAL

## 2024-07-02 PROBLEM — M54.12 RIGHT CERVICAL RADICULOPATHY: Status: ACTIVE | Noted: 2020-06-22

## 2024-07-02 PROCEDURE — 62321 NJX INTERLAMINAR CRV/THRC: CPT

## 2024-07-16 ENCOUNTER — APPOINTMENT (OUTPATIENT)
Dept: PAIN MANAGEMENT | Facility: CLINIC | Age: 54
End: 2024-07-16
Payer: COMMERCIAL

## 2024-07-16 VITALS — WEIGHT: 200 LBS | HEIGHT: 60 IN | BODY MASS INDEX: 39.27 KG/M2

## 2024-07-16 DIAGNOSIS — M54.12 RADICULOPATHY, CERVICAL REGION: ICD-10-CM

## 2024-07-16 DIAGNOSIS — M54.2 CERVICALGIA: ICD-10-CM

## 2024-07-16 PROCEDURE — 99214 OFFICE O/P EST MOD 30 MIN: CPT

## 2024-07-16 RX ORDER — BUTALBITAL, ACETAMINOPHEN AND CAFFEINE 325; 50; 40 MG/1; MG/1; MG/1
50-325-40 TABLET ORAL EVERY 4 HOURS
Qty: 30 | Refills: 0 | Status: ACTIVE | COMMUNITY
Start: 2024-07-16 | End: 1900-01-01

## 2024-07-24 ENCOUNTER — APPOINTMENT (OUTPATIENT)
Age: 54
End: 2024-07-24

## 2024-08-06 ENCOUNTER — APPOINTMENT (OUTPATIENT)
Dept: PAIN MANAGEMENT | Facility: CLINIC | Age: 54
End: 2024-08-06

## 2024-09-05 ENCOUNTER — APPOINTMENT (OUTPATIENT)
Dept: PAIN MANAGEMENT | Facility: CLINIC | Age: 54
End: 2024-09-05

## 2024-09-11 ENCOUNTER — APPOINTMENT (OUTPATIENT)
Age: 54
End: 2024-09-11

## 2024-09-17 ENCOUNTER — APPOINTMENT (OUTPATIENT)
Dept: PAIN MANAGEMENT | Facility: CLINIC | Age: 54
End: 2024-09-17

## 2024-09-24 ENCOUNTER — APPOINTMENT (OUTPATIENT)
Dept: PAIN MANAGEMENT | Facility: CLINIC | Age: 54
End: 2024-09-24

## 2024-10-08 ENCOUNTER — APPOINTMENT (OUTPATIENT)
Dept: PAIN MANAGEMENT | Facility: CLINIC | Age: 54
End: 2024-10-08

## 2025-06-24 ENCOUNTER — APPOINTMENT (OUTPATIENT)
Dept: ORTHOPEDIC SURGERY | Facility: CLINIC | Age: 55
End: 2025-06-24
Payer: COMMERCIAL

## 2025-06-24 VITALS — BODY MASS INDEX: 39.27 KG/M2 | HEIGHT: 60 IN | WEIGHT: 200 LBS

## 2025-06-24 PROCEDURE — 99214 OFFICE O/P EST MOD 30 MIN: CPT

## 2025-06-24 RX ORDER — METHYLPREDNISOLONE 4 MG/1
4 TABLET ORAL
Qty: 1 | Refills: 0 | Status: ACTIVE | COMMUNITY
Start: 2025-06-24 | End: 1900-01-01

## 2025-06-24 RX ORDER — CYCLOBENZAPRINE 10 MG/1
10 TABLET ORAL
Qty: 60 | Refills: 0 | Status: ACTIVE | COMMUNITY
Start: 2025-06-24 | End: 1900-01-01

## 2025-07-17 ENCOUNTER — APPOINTMENT (OUTPATIENT)
Dept: ENDOCRINOLOGY | Facility: CLINIC | Age: 55
End: 2025-07-17

## 2025-07-22 ENCOUNTER — APPOINTMENT (OUTPATIENT)
Dept: ORTHOPEDIC SURGERY | Facility: CLINIC | Age: 55
End: 2025-07-22
Payer: COMMERCIAL

## 2025-07-22 VITALS — WEIGHT: 200 LBS | HEIGHT: 60 IN | BODY MASS INDEX: 39.27 KG/M2

## 2025-07-22 PROCEDURE — 20611 DRAIN/INJ JOINT/BURSA W/US: CPT | Mod: 50

## 2025-07-29 ENCOUNTER — APPOINTMENT (OUTPATIENT)
Dept: ORTHOPEDIC SURGERY | Facility: CLINIC | Age: 55
End: 2025-07-29
Payer: COMMERCIAL

## 2025-07-29 PROCEDURE — 20611 DRAIN/INJ JOINT/BURSA W/US: CPT | Mod: 50

## 2025-08-05 ENCOUNTER — APPOINTMENT (OUTPATIENT)
Dept: ORTHOPEDIC SURGERY | Facility: CLINIC | Age: 55
End: 2025-08-05
Payer: COMMERCIAL

## 2025-08-05 DIAGNOSIS — M17.0 BILATERAL PRIMARY OSTEOARTHRITIS OF KNEE: ICD-10-CM

## 2025-08-05 PROCEDURE — 20611 DRAIN/INJ JOINT/BURSA W/US: CPT | Mod: 50
